# Patient Record
Sex: FEMALE | Race: WHITE | NOT HISPANIC OR LATINO | Employment: FULL TIME | URBAN - METROPOLITAN AREA
[De-identification: names, ages, dates, MRNs, and addresses within clinical notes are randomized per-mention and may not be internally consistent; named-entity substitution may affect disease eponyms.]

---

## 2017-04-17 ENCOUNTER — OFFICE VISIT (OUTPATIENT)
Dept: URGENT CARE | Facility: CLINIC | Age: 27
End: 2017-04-17
Payer: OTHER MISCELLANEOUS

## 2017-04-17 VITALS
SYSTOLIC BLOOD PRESSURE: 110 MMHG | DIASTOLIC BLOOD PRESSURE: 70 MMHG | OXYGEN SATURATION: 97 % | RESPIRATION RATE: 20 BRPM | HEART RATE: 68 BPM | BODY MASS INDEX: 21.94 KG/M2 | WEIGHT: 120 LBS | TEMPERATURE: 97.6 F

## 2017-04-17 DIAGNOSIS — J06.9 VIRAL URI WITH COUGH: ICD-10-CM

## 2017-04-17 PROCEDURE — 99203 OFFICE O/P NEW LOW 30 MIN: CPT | Performed by: NURSE PRACTITIONER

## 2017-04-17 RX ORDER — ALBUTEROL SULFATE 90 UG/1
2 AEROSOL, METERED RESPIRATORY (INHALATION) EVERY 6 HOURS PRN
Qty: 8.5 G | Refills: 0 | Status: SHIPPED | OUTPATIENT
Start: 2017-04-17 | End: 2022-04-27

## 2017-04-17 RX ORDER — PREDNISONE 10 MG/1
TABLET ORAL
Qty: 15 TAB | Refills: 0 | Status: SHIPPED | OUTPATIENT
Start: 2017-04-17 | End: 2017-04-21

## 2017-04-17 RX ORDER — CODEINE PHOSPHATE AND GUAIFENESIN 10; 100 MG/5ML; MG/5ML
5 SOLUTION ORAL EVERY 4 HOURS PRN
Qty: 300 ML | Refills: 0 | Status: SHIPPED | OUTPATIENT
Start: 2017-04-17 | End: 2019-11-14

## 2017-04-17 ASSESSMENT — ENCOUNTER SYMPTOMS
FEVER: 0
CHILLS: 0
SORE THROAT: 1
WHEEZING: 0
COUGH: 1
HEADACHES: 1
SHORTNESS OF BREATH: 1
SPUTUM PRODUCTION: 0

## 2017-04-17 NOTE — MR AVS SNAPSHOT
Jessica Hancock   2017 2:15 PM   Office Visit   MRN: [id number]    Department:  [facility]   Dept Phone:  [phone]    Description:  Female : 1990   Provider:  MAHESH Emmanuel           Reason for Visit     Cough over a week wet cough , stuffy nose and chest congestion .      Allergies as of 2017     No Known Allergies      You were diagnosed with     Viral URI with cough   [[id number]]         Vital Signs     Blood Pressure Pulse Temperature Respirations Weight Oxygen Saturation    110/70 mmHg 68 36.4 °C (97.6 °F) 20 54.432 kg (120 lb) 97%    Last Menstrual Period Smoking Status                2013 Never Smoker           Basic Information     Date Of Birth Sex Race Ethnicity Preferred Language    1990 Female White Non- English      Problem List              ICD-10-CM Priority Class Noted - Resolved    Postpartum care following vaginal delivery Z39.2   2014 - Present    Puberty bleeding N92.2   2015 - Present    Encounter for sterilization Z30.2   2015 - Present      Health Maintenance        Date Due Completion Dates    IMM HEP B VACCINE (1 of 3 - Primary Series) 1990 ---    IMM HEP A VACCINE (1 of 2 - Standard Series) 10/12/1991 ---    IMM HPV VACCINE (1 of 3 - Female 3 Dose Series) 10/12/2001 ---    IMM VARICELLA (CHICKENPOX) VACCINE (1 of 2 - 2 Dose Adolescent Series) 10/12/2003 ---    PAP SMEAR 2016    IMM DTaP/Tdap/Td Vaccine (2 - Td) 2024            Current Immunizations     Tdap Vaccine 2014  9:43 PM      Below and/or attached are the medications your provider expects you to take. Review all of your home medications and newly ordered medications with your provider and/or pharmacist. Follow medication instructions as directed by your provider and/or pharmacist. Please keep your medication list with you and share with your provider. Update the information when medications are  discontinued, doses are changed, or new medications (including over-the-counter products) are added; and carry medication information at all times in the event of emergency situations     Allergies:  No Known Allergies          Medications  Valid as of: April 17, 2017 -  3:15 PM    Generic Name Brand Name Tablet Size Instructions for use    Albuterol Sulfate (Aero Soln) albuterol 108 (90 BASE) MCG/ACT Inhale 2 Puffs by mouth every 6 hours as needed for Shortness of Breath.        Guaifenesin-Codeine (Solution) ROBITUSSIN -10 mg/5mL Take 5 mL by mouth every four hours as needed for Cough.        Hydrocodone-Acetaminophen (Tab) NORCO 5-325 MG Take 1 Tab by mouth every 6 hours as needed (for pain). Do not drive or drink alcohol or engaged in potentially hazardous activity while taking this medication        PredniSONE (Tab) DELTASONE 10 MG Take 3 tabs PO daily for five days        .                 Medicines prescribed today were sent to:     [facility]Y #[id number] - [place] - [address]    [address] [place] [address]    Phone: [phone] Fax: [phone]    Open 24 Hours?: No      Medication refill instructions:       If your prescription bottle indicates you have medication refills left, it is not necessary to call your provider’s office. Please contact your pharmacy and they will refill your medication.    If your prescription bottle indicates you do not have any refills left, you may request refills at any time through one of the following ways: The online [employer] system (except Urgent Care), by calling your provider’s office, or by asking your pharmacy to contact your provider’s office with a refill request. Medication refills are processed only during regular business hours and may not be available until the next business day. Your provider may request additional information or to have a follow-up visit with you prior to refilling your medication.   *Please Note: Medication refills are  assigned a new Rx number when refilled electronically. Your pharmacy may indicate that no refills were authorized even though a new prescription for the same medication is available at the pharmacy. Please request the medicine by name with the pharmacy before contacting your provider for a refill.           [employer] Access Code: [email or web address]  Expires: 5/17/2017 11:33 AM    [employer]  A secure, online tool to manage your health information     [employer]’s [employer]® is a secure, online tool that connects you to your personalized health information from the privacy of your home -- day or night - making it very easy for you to manage your healthcare. Once the activation process is completed, you can even access your medical information using the [employer] todd, which is available for free in the Apple Todd store or Google Play store.     [employer] provides the following levels of access (as shown below):   My Chart Features   [facility] Primary Care Doctor [facility]  Specialists [facility]  Urgent  Care Non-[facility]  Primary Care  Doctor   Email your healthcare team securely and privately 24/7 X X X    Manage appointments: schedule your next appointment; view details of past/upcoming appointments X      Request prescription refills. X      View recent personal medical records, including lab and immunizations X X X X   View health record, including health history, allergies, medications X X X X   Read reports about your outpatient visits, procedures, consult and ER notes X X X X   See your discharge summary, which is a recap of your hospital and/or ER visit that includes your diagnosis, lab results, and care plan. X X       How to register for [employer]:  1. Go to  [email or web address].  2. Click on the Sign Up Now box, which takes you to the New Member Sign Up page. You will need to provide the following information:  a. Enter your [employer] Access Code exactly as it appears at the top of this page. (You will not need to use this  code after you’ve completed the sign-up process. If you do not sign up before the expiration date, you must request a new code.)   b. Enter your date of birth.   c. Enter your home email address.   d. Click Submit, and follow the next screen’s instructions.  3. Create a [employer] ID. This will be your [employer] login ID and cannot be changed, so think of one that is secure and easy to remember.  4. Create a [employer] password. You can change your password at any time.  5. Enter your Password Reset Question and Answer. This can be used at a later time if you forget your password.   6. Enter your e-mail address. This allows you to receive e-mail notifications when new information is available in [employer].  7. Click Sign Up. You can now view your health information.    For assistance activating your [employer] account, call [phone]

## 2017-04-17 NOTE — PROGRESS NOTES
Subjective:      Jessica Hancock is a 26 y.o. female who presents with Cough            Cough  This is a new problem. Episode onset: 8 days ago. The problem has been unchanged. The cough is non-productive. Associated symptoms include headaches, nasal congestion, postnasal drip, a sore throat and shortness of breath. Pertinent negatives include no chills, fever or wheezing. Nothing aggravates the symptoms. She has tried OTC cough suppressant and rest for the symptoms. The treatment provided no relief. There is no history of asthma or bronchitis.       Review of Systems   Constitutional: Positive for malaise/fatigue. Negative for fever and chills.   HENT: Positive for congestion, postnasal drip and sore throat.    Respiratory: Positive for cough and shortness of breath. Negative for sputum production and wheezing.    Neurological: Positive for headaches.   All other systems reviewed and are negative.    PMH:  has a past medical history of Hydronephrosis (2/12/2014). She also has no past medical history of Addisons disease, Adrenal disorder, Allergy, Anemia, Anxiety, Blood transfusion, Clotting disorder, COPD, Cushings syndrome, Diabetic neuropathy, GERD (gastroesophageal reflux disease), Hyperlipidemia, Parathyroid disorder, IBD (inflammatory bowel disease), Meningitis, Migraine, Muscle disorder, OSTEOPOROSIS, Pituitary disease, Sickle cell disease, Substance abuse, or Ulcer.  MEDS:   Current outpatient prescriptions:   •  predniSONE (DELTASONE) 10 MG Tab, Take 3 tabs PO daily for five days, Disp: 15 Tab, Rfl: 0  •  albuterol 108 (90 BASE) MCG/ACT Aero Soln inhalation aerosol, Inhale 2 Puffs by mouth every 6 hours as needed for Shortness of Breath., Disp: 8.5 g, Rfl: 0  •  guaifenesin-codeine (ROBITUSSIN AC) Solution oral solution, Take 5 mL by mouth every four hours as needed for Cough., Disp: 300 mL, Rfl: 0  •  hydrocodone-acetaminophen (NORCO) 5-325 MG Tab per tablet, Take 1 Tab by mouth every 6 hours as  needed (for pain). Do not drive or drink alcohol or engaged in potentially hazardous activity while taking this medication, Disp: 20 Tab, Rfl: 0  ALLERGIES: No Known Allergies  SURGHX:   Past Surgical History   Procedure Laterality Date   • Hysteroscopy novasure-2 N/A 5/20/2015     Procedure: HYSTEROSCOPY NOVASURE ENDOMETRIAL ABLATION;  Surgeon: Roddy Simmons M.D.;  Location: SURGERY SAME DAY [facility] ORS;  Service:    • Dilation and curettage N/A 5/20/2015     Procedure: DILATION AND CURETTAGE;  Surgeon: Roddy Simmons M.D.;  Location: SURGERY SAME DAY [facility] ORS;  Service:    • Tubal coagulation laparoscopic bilateral Bilateral 5/20/2015     Procedure: TUBAL COAGULATION LAPAROSCOPIC BILATERAL;  Surgeon: Roddy Simmons M.D.;  Location: SURGERY SAME DAY [facility] ORS;  Service:      SOCHX:  reports that she has never smoked. She has never used smokeless tobacco. She reports that she does not drink alcohol or use illicit drugs.  FH: In accordance with TRAE Act of 2008, family history is not collected for Occupational Health visits.         Objective:     /70 mmHg  Pulse 68  Temp(Src) 36.4 °C (97.6 °F)  Resp 20  Wt 54.432 kg (120 lb)  SpO2 97%  LMP 07/25/2013     Physical Exam   Constitutional: She is oriented to person, place, and time. Vital signs are normal. She appears well-developed and well-nourished.   HENT:   Head: Normocephalic.   Right Ear: Tympanic membrane and external ear normal.   Left Ear: Tympanic membrane and external ear normal.   Nose: Sinus tenderness present.   Mouth/Throat: Oropharynx is clear and moist.   Eyes: EOM are normal. Pupils are equal, round, and reactive to light.   Neck: Normal range of motion.   Cardiovascular: Normal rate and regular rhythm.    Pulmonary/Chest: Effort normal. She has rhonchi in the right upper field and the left upper field.   Musculoskeletal: Normal range of motion.   Lymphadenopathy:        Head (right side): Submandibular adenopathy present.         Head (left side): Submandibular adenopathy present.     She has no cervical adenopathy.   Neurological: She is alert and oriented to person, place, and time.   Skin: Skin is warm and dry.   Psychiatric: She has a normal mood and affect. Her speech is normal and behavior is normal. Thought content normal.   Vitals reviewed.              Assessment/Plan:     1. Viral URI with cough  - predniSONE (DELTASONE) 10 MG Tab; Take 3 tabs PO daily for five days  Dispense: 15 Tab; Refill: 0  - albuterol 108 (90 BASE) MCG/ACT Aero Soln inhalation aerosol; Inhale 2 Puffs by mouth every 6 hours as needed for Shortness of Breath.  Dispense: 8.5 g; Refill: 0  - guaifenesin-codeine (ROBITUSSIN AC) Solution oral solution; Take 5 mL by mouth every four hours as needed for Cough.  Dispense: 300 mL; Refill: 0    Continue OTC daytime cough syrup  Daily OTC antihistamine  Supportive care, differential diagnoses, and indications for immediate follow-up discussed with patient.    Pathogenesis of diagnosis discussed including typical length and natural progression.      Instructed to return to  or nearest emergency department if symptoms fail to improve, for any change in condition, further concerns, or new concerning symptoms.  Patient states understanding of the plan of care and discharge instructions.

## 2017-04-17 NOTE — Clinical Note
April 17, 2017         Patient: Jessica Hancock   YOB: 1990   Date of Visit: 4/17/2017           To Whom it May Concern:    Jessica Hancock was seen in my clinic on 4/17/2017. Please excuse her from work today.      Sincerely,           BROOK Emmanuel.  Electronically Signed

## 2017-05-11 ENCOUNTER — HOSPITAL ENCOUNTER (OUTPATIENT)
Facility: MEDICAL CENTER | Age: 27
End: 2017-05-11
Attending: SPECIALIST
Payer: MEDICAID

## 2017-05-11 LAB — PATHOLOGY CONSULT NOTE: NORMAL

## 2017-05-11 PROCEDURE — 88305 TISSUE EXAM BY PATHOLOGIST: CPT

## 2017-08-25 ENCOUNTER — HOSPITAL ENCOUNTER (OUTPATIENT)
Facility: MEDICAL CENTER | Age: 27
End: 2017-08-25
Attending: SPECIALIST
Payer: COMMERCIAL

## 2017-08-25 PROCEDURE — 88305 TISSUE EXAM BY PATHOLOGIST: CPT

## 2017-08-28 LAB — PATHOLOGY CONSULT NOTE: NORMAL

## 2018-03-13 ENCOUNTER — GYNECOLOGY VISIT (OUTPATIENT)
Dept: OBGYN | Facility: CLINIC | Age: 28
End: 2018-03-13
Payer: MEDICAID

## 2018-03-13 ENCOUNTER — HOSPITAL ENCOUNTER (OUTPATIENT)
Facility: MEDICAL CENTER | Age: 28
End: 2018-03-13
Attending: OBSTETRICS & GYNECOLOGY
Payer: MEDICAID

## 2018-03-13 VITALS
SYSTOLIC BLOOD PRESSURE: 98 MMHG | BODY MASS INDEX: 21.16 KG/M2 | WEIGHT: 115 LBS | HEIGHT: 62 IN | DIASTOLIC BLOOD PRESSURE: 62 MMHG

## 2018-03-13 DIAGNOSIS — N89.8 VAGINAL ODOR: ICD-10-CM

## 2018-03-13 DIAGNOSIS — R10.2 PELVIC PAIN IN FEMALE: ICD-10-CM

## 2018-03-13 LAB
CANDIDA DNA VAG QL PROBE+SIG AMP: NEGATIVE
G VAGINALIS DNA VAG QL PROBE+SIG AMP: POSITIVE
T VAGINALIS DNA VAG QL PROBE+SIG AMP: NEGATIVE

## 2018-03-13 PROCEDURE — 87510 GARDNER VAG DNA DIR PROBE: CPT

## 2018-03-13 PROCEDURE — 87480 CANDIDA DNA DIR PROBE: CPT

## 2018-03-13 PROCEDURE — 87491 CHLMYD TRACH DNA AMP PROBE: CPT

## 2018-03-13 PROCEDURE — 99205 OFFICE O/P NEW HI 60 MIN: CPT | Performed by: OBSTETRICS & GYNECOLOGY

## 2018-03-13 PROCEDURE — 87591 N.GONORRHOEAE DNA AMP PROB: CPT

## 2018-03-13 PROCEDURE — 87660 TRICHOMONAS VAGIN DIR PROBE: CPT

## 2018-03-13 ASSESSMENT — ENCOUNTER SYMPTOMS
HEARTBURN: 0
DEPRESSION: 0
MYALGIAS: 0
RESPIRATORY NEGATIVE: 1
CONSTITUTIONAL NEGATIVE: 1
NAUSEA: 0
CARDIOVASCULAR NEGATIVE: 1
DIZZINESS: 0
EYES NEGATIVE: 1
BRUISES/BLEEDS EASILY: 0
ABDOMINAL PAIN: 1
VOMITING: 0

## 2018-03-13 NOTE — NON-PROVIDER
Pt here for New GYN would like 2nd opinion on cyst and tumor  # [phone]  Pt states having a discharge with a foul odor.

## 2018-03-14 DIAGNOSIS — B96.89 BV (BACTERIAL VAGINOSIS): ICD-10-CM

## 2018-03-14 DIAGNOSIS — N76.0 BV (BACTERIAL VAGINOSIS): ICD-10-CM

## 2018-03-14 LAB
C TRACH DNA SPEC QL NAA+PROBE: NEGATIVE
N GONORRHOEA DNA SPEC QL NAA+PROBE: NEGATIVE
SPECIMEN SOURCE: NORMAL

## 2018-03-14 NOTE — TELEPHONE ENCOUNTER
Notes Recorded by Lesa Herrera M.D. on 3/14/2018 at 12:26 PM PDT  Bacterial vaginosis please call out Flagyl 500 by mouth twice a day for 7 days    Unable to contact pt msg left to Call back.    3/16/18 @ 0651  Pt notified, instructed to finish whole treatment and not intercourse while on medication.    Verbalized understandig.

## 2018-03-15 ENCOUNTER — TELEPHONE (OUTPATIENT)
Dept: OBGYN | Facility: CLINIC | Age: 28
End: 2018-03-15

## 2018-03-15 NOTE — TELEPHONE ENCOUNTER
Pt called back. Let pt know that her labs that were done indicated she had bacterial vaginosis. A Rx Flagyl 500 mg was sent to her [facility] pharmacy on [address]. To take as directed twice daily for 7 days. Pt verbalized understanding. And had no further questions.

## 2018-03-15 NOTE — TELEPHONE ENCOUNTER
Pt returned phone call from earlier. Called pt back and went straight to VM left another message to call back.

## 2018-03-16 RX ORDER — METRONIDAZOLE 500 MG/1
500 TABLET ORAL 2 TIMES DAILY
Qty: 14 TAB | Refills: 0 | Status: SHIPPED | OUTPATIENT
Start: 2018-03-16 | End: 2018-03-23

## 2018-03-23 ENCOUNTER — HOSPITAL ENCOUNTER (OUTPATIENT)
Dept: RADIOLOGY | Facility: MEDICAL CENTER | Age: 28
End: 2018-03-23
Attending: OBSTETRICS & GYNECOLOGY
Payer: MEDICAID

## 2018-03-23 DIAGNOSIS — R10.2 PELVIC PAIN IN FEMALE: ICD-10-CM

## 2018-03-23 PROCEDURE — 76830 TRANSVAGINAL US NON-OB: CPT

## 2018-08-20 ENCOUNTER — APPOINTMENT (OUTPATIENT)
Dept: RADIOLOGY | Facility: MEDICAL CENTER | Age: 28
End: 2018-08-20
Attending: EMERGENCY MEDICINE
Payer: MEDICAID

## 2018-08-20 ENCOUNTER — HOSPITAL ENCOUNTER (EMERGENCY)
Facility: MEDICAL CENTER | Age: 28
End: 2018-08-20
Attending: EMERGENCY MEDICINE
Payer: MEDICAID

## 2018-08-20 VITALS
BODY MASS INDEX: 21.79 KG/M2 | WEIGHT: 118.39 LBS | HEIGHT: 62 IN | OXYGEN SATURATION: 97 % | DIASTOLIC BLOOD PRESSURE: 76 MMHG | SYSTOLIC BLOOD PRESSURE: 101 MMHG | RESPIRATION RATE: 20 BRPM | HEART RATE: 67 BPM | TEMPERATURE: 98.7 F

## 2018-08-20 DIAGNOSIS — N76.1 SUBACUTE VAGINITIS: ICD-10-CM

## 2018-08-20 DIAGNOSIS — R10.2 PELVIC PAIN: ICD-10-CM

## 2018-08-20 LAB
ALBUMIN SERPL BCP-MCNC: 4.4 G/DL (ref 3.2–4.9)
ALBUMIN/GLOB SERPL: 1.4 G/DL
ALP SERPL-CCNC: 61 U/L (ref 30–99)
ALT SERPL-CCNC: 10 U/L (ref 2–50)
ANION GAP SERPL CALC-SCNC: 9 MMOL/L (ref 0–11.9)
APPEARANCE UR: CLEAR
AST SERPL-CCNC: 15 U/L (ref 12–45)
BACTERIA GENITAL QL WET PREP: NORMAL
BASOPHILS # BLD AUTO: 0.6 % (ref 0–1.8)
BASOPHILS # BLD: 0.04 K/UL (ref 0–0.12)
BILIRUB SERPL-MCNC: 0.3 MG/DL (ref 0.1–1.5)
BILIRUB UR QL STRIP.AUTO: NEGATIVE
BUN SERPL-MCNC: 15 MG/DL (ref 8–22)
CALCIUM SERPL-MCNC: 9.1 MG/DL (ref 8.5–10.5)
CHLORIDE SERPL-SCNC: 105 MMOL/L (ref 96–112)
CO2 SERPL-SCNC: 22 MMOL/L (ref 20–33)
COLOR UR: YELLOW
CREAT SERPL-MCNC: 0.52 MG/DL (ref 0.5–1.4)
EOSINOPHIL # BLD AUTO: 0.3 K/UL (ref 0–0.51)
EOSINOPHIL NFR BLD: 4.3 % (ref 0–6.9)
ERYTHROCYTE [DISTWIDTH] IN BLOOD BY AUTOMATED COUNT: 39 FL (ref 35.9–50)
GLOBULIN SER CALC-MCNC: 3.2 G/DL (ref 1.9–3.5)
GLUCOSE SERPL-MCNC: 85 MG/DL (ref 65–99)
GLUCOSE UR STRIP.AUTO-MCNC: NEGATIVE MG/DL
HCG SERPL QL: NEGATIVE
HCT VFR BLD AUTO: 40.7 % (ref 37–47)
HGB BLD-MCNC: 13.5 G/DL (ref 12–16)
IMM GRANULOCYTES # BLD AUTO: 0.01 K/UL (ref 0–0.11)
IMM GRANULOCYTES NFR BLD AUTO: 0.1 % (ref 0–0.9)
KETONES UR STRIP.AUTO-MCNC: NEGATIVE MG/DL
LEUKOCYTE ESTERASE UR QL STRIP.AUTO: NEGATIVE
LYMPHOCYTES # BLD AUTO: 2.93 K/UL (ref 1–4.8)
LYMPHOCYTES NFR BLD: 41.6 % (ref 22–41)
MCH RBC QN AUTO: 29.8 PG (ref 27–33)
MCHC RBC AUTO-ENTMCNC: 33.2 G/DL (ref 33.6–35)
MCV RBC AUTO: 89.8 FL (ref 81.4–97.8)
MICRO URNS: NORMAL
MONOCYTES # BLD AUTO: 0.66 K/UL (ref 0–0.85)
MONOCYTES NFR BLD AUTO: 9.4 % (ref 0–13.4)
NEUTROPHILS # BLD AUTO: 3.11 K/UL (ref 2–7.15)
NEUTROPHILS NFR BLD: 44 % (ref 44–72)
NITRITE UR QL STRIP.AUTO: NEGATIVE
NRBC # BLD AUTO: 0 K/UL
NRBC BLD-RTO: 0 /100 WBC
PH UR STRIP.AUTO: 6 [PH]
PLATELET # BLD AUTO: 270 K/UL (ref 164–446)
PMV BLD AUTO: 9.6 FL (ref 9–12.9)
POTASSIUM SERPL-SCNC: 3.8 MMOL/L (ref 3.6–5.5)
PROT SERPL-MCNC: 7.6 G/DL (ref 6–8.2)
PROT UR QL STRIP: NEGATIVE MG/DL
RBC # BLD AUTO: 4.53 M/UL (ref 4.2–5.4)
RBC UR QL AUTO: NEGATIVE
SIGNIFICANT IND 70042: NORMAL
SITE SITE: NORMAL
SODIUM SERPL-SCNC: 136 MMOL/L (ref 135–145)
SOURCE SOURCE: NORMAL
SP GR UR STRIP.AUTO: 1.03
UROBILINOGEN UR STRIP.AUTO-MCNC: 0.2 MG/DL
WBC # BLD AUTO: 7.1 K/UL (ref 4.8–10.8)

## 2018-08-20 PROCEDURE — 76830 TRANSVAGINAL US NON-OB: CPT

## 2018-08-20 PROCEDURE — 80053 COMPREHEN METABOLIC PANEL: CPT

## 2018-08-20 PROCEDURE — 87591 N.GONORRHOEAE DNA AMP PROB: CPT

## 2018-08-20 PROCEDURE — 81003 URINALYSIS AUTO W/O SCOPE: CPT

## 2018-08-20 PROCEDURE — 84703 CHORIONIC GONADOTROPIN ASSAY: CPT

## 2018-08-20 PROCEDURE — 87491 CHLMYD TRACH DNA AMP PROBE: CPT

## 2018-08-20 PROCEDURE — 85025 COMPLETE CBC W/AUTO DIFF WBC: CPT

## 2018-08-20 PROCEDURE — 99284 EMERGENCY DEPT VISIT MOD MDM: CPT

## 2018-08-20 RX ORDER — METRONIDAZOLE 500 MG/1
500 TABLET ORAL 3 TIMES DAILY
Qty: 21 TAB | Refills: 0 | Status: SHIPPED | OUTPATIENT
Start: 2018-08-20 | End: 2018-08-27

## 2018-08-20 NOTE — ED PROVIDER NOTES
"ED Provider Note    Scribed for Jagdish Cobb M.D. by Eleazar Plunkett. 2018  4:04 PM    Primary Care Provider: Pcp Pt States None  Means of arrival: Walk In   History limited by: None     CHIEF COMPLAINT  Chief Complaint   Patient presents with   • Pelvic Pain     hx of mass in uterus     HPI  Jessica Hancock is a 27 y.o. female who presents to the ED complaining of pelvic pain.   The patient complains of intermittent right sided pelvic pain for the last few weeks. She describes her pain as \"dull\", and she rates her worst pain as being 9/10 in severity. The patient describes her pain has been constant for the last few days with occasional \"sharp\" pelvic pain radiating down her right leg. The patient describes her pain as feeling similar to her history of ovarian cysts. She has medicated her pain with Ibuprofen and Tylenol PO with some relief.   The patient had a bilateral laparoscopic tubal coagulation performed in  by Dr. Simmons. The patient has an obstetric history of . Last menstrual period was years ago. She denies any abnormal vaginal discharge.     The patient denies any fever, weakness, blurred vision, sore throat, chest pain, palpitations, leg swelling, nausea, vomiting, diarrhea, back pain, or rash.       REVIEW OF SYSTEMS  CONSTITUTIONAL:  Denies fever, chills, weight gain/loss, or weakness.  EYES:  Denies blurred vision.   ENT:  Denies sore throat  CARDIOVASCULAR:  Denies chest pain, palpitations, or swelling.  RESPIRATORY:  Denies cough, shortness of breath, difficulty breathing.  GI:  Positive for right sided pelvic pain. Denies nausea, vomiting, or diarrhea.  MUSCULOSKELETAL:  Denies weakness, joint swelling, or back pain.  SKIN:  No rash.   NEUROLOGIC:  Denies headache, focal weakness, or numbness.    PAST MEDICAL HISTORY  Past Medical History:   Diagnosis Date   • Hydronephrosis 2014     FAMILY HISTORY  Family History   Problem Relation Age of Onset   • No Known Problems " "Mother    • No Known Problems Father    • Blood Clots Sister    • Arthritis Maternal Grandmother    • Hypertension Maternal Grandmother    • No Known Problems Maternal Grandfather    • Depression Paternal Grandmother    • No Known Problems Sister      SOCIAL HISTORY   reports that she has been smoking Cigarettes.  She has smoked for the past 12.00 years. She has never used smokeless tobacco. She reports that she uses drugs, including Marijuana. She reports that she does not drink alcohol.    SURGICAL HISTORY  Past Surgical History:   Procedure Laterality Date   • HYSTEROSCOPY NOVASURE-2 N/A 5/20/2015    Procedure: HYSTEROSCOPY NOVASURE ENDOMETRIAL ABLATION;  Surgeon: Roddy Simmons M.D.;  Location: SURGERY SAME DAY [facility] ORS;  Service:    • DILATION AND CURETTAGE N/A 5/20/2015    Procedure: DILATION AND CURETTAGE;  Surgeon: Roddy Simmons M.D.;  Location: SURGERY SAME DAY [facility] ORS;  Service:    • TUBAL COAGULATION LAPAROSCOPIC BILATERAL Bilateral 5/20/2015    Procedure: TUBAL COAGULATION LAPAROSCOPIC BILATERAL;  Surgeon: Roddy Simmons M.D.;  Location: SURGERY SAME DAY [facility] ORS;  Service:      CURRENT MEDICATIONS  Home Medications    **Home medications have not yet been reviewed for this encounter**       ALLERGIES  No Known Allergies    PHYSICAL EXAM  VITAL SIGNS: /58   Pulse 74   Temp 37.1 °C (98.7 °F)   Resp 16   Ht 1.575 m (5' 2\")   Wt 53.7 kg (118 lb 6.2 oz)   SpO2 97%   BMI 21.65 kg/m²      Constitutional: Patient is awake and alert. No acute respiratory distress. Well developed, Well nourished, Non-toxic appearance.   HENT: Normocephalic, Atraumatic, Bilateral external ears normal, Oropharynx pink moist with no exudates.   Eyes: Sclera and conjunctiva clear, No discharge.   Neck:  Supple no nuchal rigidity, no thyromegaly or mass.  Lymphatic: No supraclavicular lymph nodes.   Cardiovascular: Heart is regular rate and rhythm no murmur,  Thorax & Lungs: Chest is symmetrical, with " good breath sounds. No wheezing or crackles. No respiratory distress, No chest tenderness.   Abdomen: Soft, Tenderness in suprapubic region and right lower abdomen. NO upper abdominal tenderness. No hepatosplenomegaly there is no guarding or rebound, No masses, No pulsatile masses.   Pelvic: Chaperoned by female RN in the room. Normal female external genitalia. Vaginal vault: small amount of white discharge. Bimanual exam: No left adnexal or uterine tenderness. There is tenderness in right adnexa low in the pelvis. Obtained wet prep and GC probe.   Skin: Warm, Dry, no petechia, purpura, or rash.   Back: Non tender with palpation, No bilateral CVA tenderness.   Extremities: No edema.  Musculoskeletal: Good range of motion to upper lower extremity   neurologic: Alert & oriented  Strength is symmetrical in the upper extremities  Psychiatric: Normal affect        LABS  Results for orders placed or performed during the hospital encounter of 08/20/18   CBC WITH DIFFERENTIAL   Result Value Ref Range    WBC 7.1 4.8 - 10.8 K/uL    RBC 4.53 4.20 - 5.40 M/uL    Hemoglobin 13.5 12.0 - 16.0 g/dL    Hematocrit 40.7 37.0 - 47.0 %    MCV 89.8 81.4 - 97.8 fL    MCH 29.8 27.0 - 33.0 pg    MCHC 33.2 (L) 33.6 - 35.0 g/dL    RDW 39.0 35.9 - 50.0 fL    Platelet Count 270 164 - 446 K/uL    MPV 9.6 9.0 - 12.9 fL    Neutrophils-Polys 44.00 44.00 - 72.00 %    Lymphocytes 41.60 (H) 22.00 - 41.00 %    Monocytes 9.40 0.00 - 13.40 %    Eosinophils 4.30 0.00 - 6.90 %    Basophils 0.60 0.00 - 1.80 %    Immature Granulocytes 0.10 0.00 - 0.90 %    Nucleated RBC 0.00 /100 WBC    Neutrophils (Absolute) 3.11 2.00 - 7.15 K/uL    Lymphs (Absolute) 2.93 1.00 - 4.80 K/uL    Monos (Absolute) 0.66 0.00 - 0.85 K/uL    Eos (Absolute) 0.30 0.00 - 0.51 K/uL    Baso (Absolute) 0.04 0.00 - 0.12 K/uL    Immature Granulocytes (abs) 0.01 0.00 - 0.11 K/uL    NRBC (Absolute) 0.00 K/uL   COMP METABOLIC PANEL   Result Value Ref Range    Sodium 136 135 - 145 mmol/L     Potassium 3.8 3.6 - 5.5 mmol/L    Chloride 105 96 - 112 mmol/L    Co2 22 20 - 33 mmol/L    Anion Gap 9.0 0.0 - 11.9    Glucose 85 65 - 99 mg/dL    Bun 15 8 - 22 mg/dL    Creatinine 0.52 0.50 - 1.40 mg/dL    Calcium 9.1 8.5 - 10.5 mg/dL    AST(SGOT) 15 12 - 45 U/L    ALT(SGPT) 10 2 - 50 U/L    Alkaline Phosphatase 61 30 - 99 U/L    Total Bilirubin 0.3 0.1 - 1.5 mg/dL    Albumin 4.4 3.2 - 4.9 g/dL    Total Protein 7.6 6.0 - 8.2 g/dL    Globulin 3.2 1.9 - 3.5 g/dL    A-G Ratio 1.4 g/dL   HCG QUAL SERUM   Result Value Ref Range    Beta-Hcg Qualitative Serum Negative Negative   URINALYSIS CULTURE, IF INDICATED   Result Value Ref Range    Color Yellow     Character Clear     Specific Gravity 1.029 <1.035    Ph 6.0 5.0 - 8.0    Glucose Negative Negative mg/dL    Ketones Negative Negative mg/dL    Protein Negative Negative mg/dL    Bilirubin Negative Negative    Urobilinogen, Urine 0.2 Negative    Nitrite Negative Negative    Leukocyte Esterase Negative Negative    Occult Blood Negative Negative    Micro Urine Req see below    WET PREP   Result Value Ref Range    Significant Indicator NEG     Source GEN     Site VAGINAL     Wet Prep For Parasites       Moderate WBC's seen.  Few clue cells seen.  No yeast.  No motile Trichomonas seen.     CHLAMYDIA & GC BY PCR   Result Value Ref Range    Source Other    ESTIMATED GFR   Result Value Ref Range    GFR If African American >60 >60 mL/min/1.73 m 2    GFR If Non African American >60 >60 mL/min/1.73 m 2      All labs reviewed by me.    RADIOLOGY/PROCEDURES  US-GYN-PELVIS TRANSVAGINAL   Final Result      Small amount of nonspecific fluid in the endometrial canal extending into the cornua.        The radiologist's interpretations of all radiological studies have been reviewed by me.     COURSE & MEDICAL DECISION MAKING  Pertinent Labs & Imaging studies reviewed. (See chart for details)    Differential diagnoses include but are not limited to ovarian cyst, ovarian torsion, ectopic  pregnancy, intrauterine pregnancy, kidney stone, urinary tract infection, pelvic inflammatory disease, appendicitis    4:04 PM - Patient seen and examined at bedside. Ordered CBC, CMP, HCG, urinalysis.      4:34 PM Performed pelvic exam chaperoned by female RN. Obtained GC probe and wet prep. Ultrasound was ordered.     7:12 PM Recheck: Patient re-evaluated at beside. The patient was updated of her lab and ultrasound results.   The patient was prescribed Flagyl for treatment of subacute vaginitis.   We discussed discharge instructions. The patient was given strict return precautions. Advised close follow up with [facility] in 3 days.         Decision Making  Patient states that she has had intermittent chronic right lower quadrant abdominal pain.  She initially seen a gynecologist who did not biopsy on her uterus.  But is in the last couple of weeks the pain seems to be getting a little bit worse and she is concerned.  Here in the emergency room she did have some right adnexal pain certainly seem to be more on the ovary area than anywhere else.  Her workup revealed that she had clue cells and probable vaginitis.  I do not clinically think she had appendicitis.  Will put her on Flagyl for now.  There is no signs of torsion.  She knows that she needs close follow-up with gynecology and she will follow-up the primary care physician in the next 1-2 days as well.          DISPOSITION:  Patient will be discharged home in stable condition.    FOLLOW UP:  [address] [address]  [phone]  In 3 days      OUTPATIENT MEDICATIONS:  New Prescriptions    METRONIDAZOLE (FLAGYL) 500 MG TAB    Take 1 Tab by mouth 3 times a day for 7 days.      FINAL IMPRESSION  1. Pelvic pain    2. Subacute vaginitis        PLAN  1.  Follow-up primary care physician as an outpatient within 3 days  2.  Vaginitis information sheet  3.  Flagyl  4.  Pelvic pain information sheet  5. Return to the emergency  department for increased pains, fevers, vomiting or change in condition.     IEleazar (Scribe), am scribing for, and in the presence of, Jagdish Cobb M.D..    Electronically signed by: Eleazar Plunkett (Scribe), 8/20/2018    IJagdish M.D. personally performed the services described in this documentation, as scribed by Eleazar Plunkett in my presence, and it is both accurate and complete.    The note accurately reflects work and decisions made by me.  Jagdish Cobb  8/20/2018  11:35 PM

## 2018-08-20 NOTE — ED TRIAGE NOTES
Pt ambulates to triage  Chief Complaint   Patient presents with   • Pelvic Pain     hx of mass in uterus   pt reports pain x 1 year  No evidence of mass seen in medical record, seen by MD for same in past  Pt asked to wait in lobby, pt updated on triage process and pt asked to inform RN of any changes.

## 2019-07-16 ENCOUNTER — GYNECOLOGY VISIT (OUTPATIENT)
Dept: OBGYN | Facility: CLINIC | Age: 29
End: 2019-07-16
Payer: COMMERCIAL

## 2019-07-16 ENCOUNTER — HOSPITAL ENCOUNTER (OUTPATIENT)
Facility: MEDICAL CENTER | Age: 29
End: 2019-07-16
Attending: OBSTETRICS & GYNECOLOGY
Payer: COMMERCIAL

## 2019-07-16 VITALS — SYSTOLIC BLOOD PRESSURE: 100 MMHG | BODY MASS INDEX: 21.77 KG/M2 | WEIGHT: 119 LBS | DIASTOLIC BLOOD PRESSURE: 58 MMHG

## 2019-07-16 DIAGNOSIS — Z11.3 SCREEN FOR STD (SEXUALLY TRANSMITTED DISEASE): ICD-10-CM

## 2019-07-16 DIAGNOSIS — Z01.411 ENCNTR FOR GYN EXAM (GENERAL) (ROUTINE) W ABNORMAL FINDINGS: ICD-10-CM

## 2019-07-16 DIAGNOSIS — R10.2 PELVIC PAIN: ICD-10-CM

## 2019-07-16 DIAGNOSIS — Z98.51 HX OF TUBAL LIGATION: ICD-10-CM

## 2019-07-16 DIAGNOSIS — Z12.4 CERVICAL CANCER SCREENING: ICD-10-CM

## 2019-07-16 DIAGNOSIS — Z98.890 HISTORY OF ENDOMETRIAL ABLATION: ICD-10-CM

## 2019-07-16 PROCEDURE — 87491 CHLMYD TRACH DNA AMP PROBE: CPT

## 2019-07-16 PROCEDURE — 87591 N.GONORRHOEAE DNA AMP PROB: CPT

## 2019-07-16 PROCEDURE — 88142 CYTOPATH C/V THIN LAYER: CPT

## 2019-07-16 PROCEDURE — 99395 PREV VISIT EST AGE 18-39: CPT | Performed by: OBSTETRICS & GYNECOLOGY

## 2019-07-16 NOTE — PROGRESS NOTES
Subjective:      Jessica Hancock is a 28 y.o. female who presents for Annual Exam            HPI patient is a 28-year-old  4 para 3-0-1-3 who presents today for annual gynecologic exam.  Patient underwent tubal ligation along with endometrial ablation in 2016 and states she started experiencing right lower pelvic pain and discomfort a few months after procedure.  Symptoms are present most days of the month and patient states she can go 1 to 2 days without feeling any of the pain symptoms but symptoms are there most days of the month.  She usually uses a heating pack which helped relieve some of the symptoms.  Patient states she was amenorrheic since ablation until about a month ago when she had one episode of bleeding.  She denies any vaginal discharge  She states she noticed a few bumps in the periurethral area that are small and white in appearance and she requested screening for gonorrhea and chlamydia.  She reports no history of PID or STDs.  Reports no abnormal Pap smears.  Patient reports normal bowel and bladder functions    ROS all organ systems were reviewed and were negative except for complaints in HPI       Objective:     /58   Wt 54 kg (119 lb)   LMP 2019 (Exact Date)   BMI 21.77 kg/m²      Physical Exam   Constitutional: She is oriented to person, place, and time. She appears well-developed and well-nourished. No distress.   HENT:   Head: Normocephalic and atraumatic.   Eyes: Pupils are equal, round, and reactive to light. Right eye exhibits no discharge. Left eye exhibits no discharge.   Neck: Normal range of motion. Neck supple. No tracheal deviation present. No thyromegaly present.   Cardiovascular: Normal rate, regular rhythm, normal heart sounds and intact distal pulses.    Pulmonary/Chest: Effort normal and breath sounds normal. No respiratory distress. She has no wheezes. She has no rales. Right breast exhibits no inverted nipple, no mass, no nipple discharge, no  skin change and no tenderness. Left breast exhibits no inverted nipple, no mass, no nipple discharge, no skin change and no tenderness.   Abdominal: Soft. Bowel sounds are normal. She exhibits no distension. There is tenderness (Mild soreness and palpation of right lower quadrant). There is no guarding. Hernia confirmed negative in the right inguinal area and confirmed negative in the left inguinal area.   Genitourinary: There is no rash, tenderness, lesion or injury on the right labia. There is no rash, tenderness, lesion or injury on the left labia. Uterus is not enlarged and not tender. Cervix exhibits no motion tenderness, no discharge and no friability. Right adnexum displays tenderness (Mild tenderness to palpation). Right adnexum displays no mass and no fullness. Left adnexum displays no mass, no tenderness and no fullness. No erythema, tenderness or bleeding in the vagina. No foreign body in the vagina. No signs of injury around the vagina. No vaginal discharge found.   Musculoskeletal: Normal range of motion. She exhibits no edema or deformity.   Lymphadenopathy:        Right: No inguinal adenopathy present.        Left: No inguinal adenopathy present.   Neurological: She is alert and oriented to person, place, and time. She displays normal reflexes. No cranial nerve deficit. Coordination normal.   Skin: Skin is warm and dry. No rash noted. She is not diaphoretic. No erythema.   Psychiatric: She has a normal mood and affect. Her behavior is normal. Judgment and thought content normal.   Nursing note and vitals reviewed.       Discussion:    I reviewed 2 previous ultrasounds with patient showing normal adnexa.  Her last ultrasound there was some fluid extending from the uterus to the fallopian tube.  I discussed possibility of post ablation/post tubal ligation syndrome.  I discussed pelvic pain and possible etiology.  Patient desires to observe symptoms at this point and will call office if her symptoms  worsens.  We discussed need for possible follow-up ultrasound if her symptoms worsen and patient agrees.       Assessment/Plan:     1. Encntr for gyn exam (general) (routine) w abnormal findings  28-year-old multiparous female here for annual gynecologic exam    2. Pelvic pain  Patient counseled on possible etiology for pain.  Differential diagnosis discussed.  Foot measures reviewed.  No treatment desired at this point    3. Hx of tubal ligation  Patient has had history of laparoscopic tubal ligation    4. History of endometrial ablation  Patient has had history of endometrial ablation    Safe sex practices discussed      Monthly self breast exams encouraged    Precautions and plan of care reviewed    5. Cervical cancer screening  Cervical cancer screening-Pap smear obtained    6. Screen for STD (sexually transmitted disease)  Patient desires testing for gonorrhea chlamydia-testing obtained

## 2019-07-16 NOTE — NON-PROVIDER
Pt here for annual exam with pap  Pt states having right ovarian pain  Good#[phone]  Pharmacy verified

## 2019-07-18 LAB
C TRACH DNA GENITAL QL NAA+PROBE: NEGATIVE
N GONORRHOEA DNA GENITAL QL NAA+PROBE: NEGATIVE
SPECIMEN SOURCE: NORMAL

## 2019-07-27 LAB — TEST NAME 95000: NORMAL

## 2019-07-29 ENCOUNTER — TELEPHONE (OUTPATIENT)
Dept: OBGYN | Facility: CLINIC | Age: 29
End: 2019-07-29

## 2019-07-29 NOTE — TELEPHONE ENCOUNTER
"Pt called stating she received \"miscellaneous\" results but unsure of what that is, adv pt pap results and  Notified pt they are negative and GC/CT as well. Pt understood and had no further questions   "

## 2019-11-11 ENCOUNTER — APPOINTMENT (OUTPATIENT)
Dept: URGENT CARE | Facility: PHYSICIAN GROUP | Age: 29
End: 2019-11-11
Payer: COMMERCIAL

## 2019-11-13 ENCOUNTER — TELEPHONE (OUTPATIENT)
Dept: SCHEDULING | Facility: IMAGING CENTER | Age: 29
End: 2019-11-13

## 2019-11-14 ENCOUNTER — HOSPITAL ENCOUNTER (OUTPATIENT)
Dept: LAB | Facility: MEDICAL CENTER | Age: 29
End: 2019-11-14
Attending: NURSE PRACTITIONER
Payer: COMMERCIAL

## 2019-11-14 ENCOUNTER — OFFICE VISIT (OUTPATIENT)
Dept: MEDICAL GROUP | Facility: PHYSICIAN GROUP | Age: 29
End: 2019-11-14
Payer: COMMERCIAL

## 2019-11-14 VITALS
OXYGEN SATURATION: 98 % | HEIGHT: 66 IN | DIASTOLIC BLOOD PRESSURE: 70 MMHG | BODY MASS INDEX: 19.06 KG/M2 | WEIGHT: 118.6 LBS | SYSTOLIC BLOOD PRESSURE: 102 MMHG | RESPIRATION RATE: 18 BRPM | TEMPERATURE: 98.1 F | HEART RATE: 85 BPM

## 2019-11-14 DIAGNOSIS — Z00.00 ANNUAL PHYSICAL EXAM: ICD-10-CM

## 2019-11-14 DIAGNOSIS — R91.8 ABNORMAL CT SCAN OF LUNG: ICD-10-CM

## 2019-11-14 LAB
25(OH)D3 SERPL-MCNC: 12 NG/ML (ref 30–100)
ALBUMIN SERPL BCP-MCNC: 4.7 G/DL (ref 3.2–4.9)
ALBUMIN/GLOB SERPL: 1.4 G/DL
ALP SERPL-CCNC: 50 U/L (ref 30–99)
ALT SERPL-CCNC: 15 U/L (ref 2–50)
ANION GAP SERPL CALC-SCNC: 10 MMOL/L (ref 0–11.9)
AST SERPL-CCNC: 20 U/L (ref 12–45)
BASOPHILS # BLD AUTO: 0.9 % (ref 0–1.8)
BASOPHILS # BLD: 0.05 K/UL (ref 0–0.12)
BILIRUB SERPL-MCNC: 0.6 MG/DL (ref 0.1–1.5)
BUN SERPL-MCNC: 12 MG/DL (ref 8–22)
CALCIUM SERPL-MCNC: 9.5 MG/DL (ref 8.5–10.5)
CHLORIDE SERPL-SCNC: 105 MMOL/L (ref 96–112)
CHOLEST SERPL-MCNC: 175 MG/DL (ref 100–199)
CO2 SERPL-SCNC: 23 MMOL/L (ref 20–33)
CREAT SERPL-MCNC: 0.52 MG/DL (ref 0.5–1.4)
EOSINOPHIL # BLD AUTO: 0.04 K/UL (ref 0–0.51)
EOSINOPHIL NFR BLD: 0.7 % (ref 0–6.9)
ERYTHROCYTE [DISTWIDTH] IN BLOOD BY AUTOMATED COUNT: 39.8 FL (ref 35.9–50)
EST. AVERAGE GLUCOSE BLD GHB EST-MCNC: 103 MG/DL
FERRITIN SERPL-MCNC: 69.1 NG/ML (ref 10–291)
FOLATE SERPL-MCNC: >22.4 NG/ML
GLOBULIN SER CALC-MCNC: 3.3 G/DL (ref 1.9–3.5)
GLUCOSE SERPL-MCNC: 80 MG/DL (ref 65–99)
HBA1C MFR BLD: 5.2 % (ref 0–5.6)
HCT VFR BLD AUTO: 41.4 % (ref 37–47)
HDLC SERPL-MCNC: 61 MG/DL
HGB BLD-MCNC: 13.7 G/DL (ref 12–16)
IMM GRANULOCYTES # BLD AUTO: 0.02 K/UL (ref 0–0.11)
IMM GRANULOCYTES NFR BLD AUTO: 0.4 % (ref 0–0.9)
IRON SERPL-MCNC: 112 UG/DL (ref 40–170)
LDLC SERPL CALC-MCNC: 102 MG/DL
LYMPHOCYTES # BLD AUTO: 1.94 K/UL (ref 1–4.8)
LYMPHOCYTES NFR BLD: 34.6 % (ref 22–41)
MCH RBC QN AUTO: 30.4 PG (ref 27–33)
MCHC RBC AUTO-ENTMCNC: 33.1 G/DL (ref 33.6–35)
MCV RBC AUTO: 91.8 FL (ref 81.4–97.8)
MONOCYTES # BLD AUTO: 0.7 K/UL (ref 0–0.85)
MONOCYTES NFR BLD AUTO: 12.5 % (ref 0–13.4)
NEUTROPHILS # BLD AUTO: 2.86 K/UL (ref 2–7.15)
NEUTROPHILS NFR BLD: 50.9 % (ref 44–72)
NRBC # BLD AUTO: 0 K/UL
NRBC BLD-RTO: 0 /100 WBC
PLATELET # BLD AUTO: 262 K/UL (ref 164–446)
PMV BLD AUTO: 10 FL (ref 9–12.9)
POTASSIUM SERPL-SCNC: 4 MMOL/L (ref 3.6–5.5)
PROT SERPL-MCNC: 8 G/DL (ref 6–8.2)
RBC # BLD AUTO: 4.51 M/UL (ref 4.2–5.4)
SODIUM SERPL-SCNC: 138 MMOL/L (ref 135–145)
T4 FREE SERPL-MCNC: 0.94 NG/DL (ref 0.53–1.43)
TRIGL SERPL-MCNC: 58 MG/DL (ref 0–149)
TSH SERPL DL<=0.005 MIU/L-ACNC: 1.96 UIU/ML (ref 0.38–5.33)
VIT B12 SERPL-MCNC: 639 PG/ML (ref 211–911)
WBC # BLD AUTO: 5.6 K/UL (ref 4.8–10.8)

## 2019-11-14 PROCEDURE — 99214 OFFICE O/P EST MOD 30 MIN: CPT | Performed by: NURSE PRACTITIONER

## 2019-11-14 PROCEDURE — 84443 ASSAY THYROID STIM HORMONE: CPT

## 2019-11-14 PROCEDURE — 85025 COMPLETE CBC W/AUTO DIFF WBC: CPT

## 2019-11-14 PROCEDURE — 82607 VITAMIN B-12: CPT

## 2019-11-14 PROCEDURE — 80053 COMPREHEN METABOLIC PANEL: CPT

## 2019-11-14 PROCEDURE — 82728 ASSAY OF FERRITIN: CPT

## 2019-11-14 PROCEDURE — 83540 ASSAY OF IRON: CPT

## 2019-11-14 PROCEDURE — 80061 LIPID PANEL: CPT

## 2019-11-14 PROCEDURE — 84439 ASSAY OF FREE THYROXINE: CPT

## 2019-11-14 PROCEDURE — 83036 HEMOGLOBIN GLYCOSYLATED A1C: CPT

## 2019-11-14 PROCEDURE — 82746 ASSAY OF FOLIC ACID SERUM: CPT

## 2019-11-14 PROCEDURE — 36415 COLL VENOUS BLD VENIPUNCTURE: CPT

## 2019-11-14 PROCEDURE — 82306 VITAMIN D 25 HYDROXY: CPT

## 2019-11-14 RX ORDER — PHENAZOPYRIDINE HYDROCHLORIDE 200 MG/1
200 TABLET, FILM COATED ORAL 3 TIMES DAILY PRN
COMMUNITY
End: 2019-11-22 | Stop reason: SDUPTHER

## 2019-11-14 RX ORDER — CEFDINIR 300 MG/1
300 CAPSULE ORAL 2 TIMES DAILY
COMMUNITY
Start: 2019-11-13 | End: 2019-11-20

## 2019-11-14 RX ORDER — IBUPROFEN 400 MG/1
400 TABLET ORAL EVERY 8 HOURS PRN
COMMUNITY
End: 2022-04-27

## 2019-11-14 ASSESSMENT — PATIENT HEALTH QUESTIONNAIRE - PHQ9: CLINICAL INTERPRETATION OF PHQ2 SCORE: 0

## 2019-11-14 NOTE — LETTER
[facility]  MAHESH Orantes  [address]  [place] [address]  Fax: [phone]   Authorization for Release/Disclosure of   Protected Health Information   Name: DOLORES COWAN : 1990 SSN: [id number]   Address: [address] [place] [address] [place] [address] Phone:    There are no phone numbers on file.   I authorize the entity listed below to release/disclose the PHI below to:   [facility]/MAHESH Orantes and MAHESH Orantes   Provider or Entity Name:  [facility], [facility]   City, State, [facility]   Phone:      Fax:     Reason for request: continuity of care   Information to be released:    [  ] LAST COLONOSCOPY,  including any PATH REPORT and follow-up  [  ] LAST FIT/COLOGUARD RESULT [  ] LAST DEXA  [  ] LAST MAMMOGRAM  [  ] LAST PAP  [  ] LAST LABS [  ] RETINA EXAM REPORT  [  ] IMMUNIZATION RECORDS  [ X ] Release all info      [  ] Check here and initial the line next to each item to release ALL health information INCLUDING  _____ Care and treatment for drug and / or alcohol abuse  _____ HIV testing, infection status, or AIDS  _____ Genetic Testing    DATES OF SERVICE OR TIME PERIOD TO BE DISCLOSED: _____________  I understand and acknowledge that:  * This Authorization may be revoked at any time by you in writing, except if your health information has already been used or disclosed.  * Your health information that will be used or disclosed as a result of you signing this authorization could be re-disclosed by the recipient. If this occurs, your re-disclosed health information may no longer be protected by State or Federal laws.  * You may refuse to sign this Authorization. Your refusal will not affect your ability to obtain treatment.  * This Authorization becomes effective upon signing and will  on (date) __________.      If no date is indicated, this Authorization will  one (1) year from the signature date.    Name: Dolores  Veena Hancock    Signature:   Date:     11/14/2019       PLEASE FAX REQUESTED RECORDS BACK TO: [phone]

## 2019-11-14 NOTE — PROGRESS NOTES
"Chief Complaint   Patient presents with   • Requesting Labs     Wanting imaging for possible lung nodule       HISTORY OF PRESENT ILLNESS: Patient is a 29 y.o. female, established patient who presents today to discuss medical problems as listed below:    Health Maintenance:  COMPLETED.    Abnormal CT scan of lung  New patient here to establish care.  Reports blood in urine and right flank pain (resolved now) for which she was seen at [place] ED on Tuesday, diagnosed with kidney infection (on cefdinir 7-day course). Hx of hydronephrosis in 2015 during last pregnancy. CT of the kidneys was performed, with incidental finding of \" enlarged lymph nodes in the lower lungs\", per patient.  She was recommended to follow-up as noted nodules.  Patient denies fevers, unplanned weight loss, fatigue or generalized weakness.  Denies cough, no blood in sputum, denies chest pain, dyspnea.  Patient admits to inhaling marijuana. History of smoking for 12 years, on and off, now down to 1 to 2 cigarettes every other day, distant history of EtOH.   Patient states this finding is giving her anxiety and she would like to to get evaluated as soon as possible.       Patient Active Problem List    Diagnosis Date Noted   • Annual physical exam 11/14/2019   • Abnormal CT scan of lung 11/14/2019   • History of endometrial ablation 07/16/2019   • Hx of tubal ligation 07/16/2019        Allergies: Patient has no known allergies.    Current Outpatient Medications   Medication Sig Dispense Refill   • cefdinir (OMNICEF) 300 MG Cap Take 300 mg by mouth 2 times a day.     • ibuprofen (MOTRIN) 400 MG Tab Take 400 mg by mouth every 8 hours as needed.     • phenazopyridine (PYRIDIUM) 200 MG Tab Take 200 mg by mouth 3 times a day as needed.     • albuterol 108 (90 BASE) MCG/ACT Aero Soln inhalation aerosol Inhale 2 Puffs by mouth every 6 hours as needed for Shortness of Breath. (Patient not taking: Reported on 3/13/2018) 8.5 g 0     No current " facility-administered medications for this visit.        Social History     Tobacco Use   • Smoking status: Light Tobacco Smoker     Packs/day: 0.00     Years: 12.00     Pack years: 0.00     Types: Cigarettes   • Smokeless tobacco: Never Used   • Tobacco comment: Pt states smokes every other day    Substance Use Topics   • Alcohol use: No     Comment: socially    • Drug use: Yes     Types: Marijuana     Social History     Patient does not qualify to have social determinant information on file (likely too young).   Social History Narrative   • Not on file       Family History   Problem Relation Age of Onset   • No Known Problems Mother    • No Known Problems Father    • Blood Clots Sister         contra   • Arthritis Maternal Grandmother    • Hypertension Maternal Grandmother    • No Known Problems Maternal Grandfather    • Depression Paternal Grandmother    • No Known Problems Sister    • Cancer Maternal Aunt         bone       Allergies, past medical history, past surgical history, family history, social history reviewed and updated.    Review of Systems:      - Constitutional: Negative for fever, chills, unexpected weight change, and fatigue/generalized weakness.     - HEENT: Negative for headaches, vision changes, hearing changes, ear pain, ear discharge, rhinorrhea, sinus congestion, sore throat, and neck pain.      - Respiratory: Negative for cough, sputum production, chest congestion, dyspnea, wheezing, and crackles.      - Cardiovascular: Negative for chest pain, palpitations, orthopnea, and bilateral lower extremity edema.     - Gastrointestinal: Negative for heartburn, nausea, vomiting, abdominal pain, hematochezia, melena, diarrhea, constipation, and greasy/foul-smelling stools.     - Genitourinary: Negative for dysuria, polyuria, hematuria, pyuria, urinary urgency, and urinary incontinence.    - Musculoskeletal: Negative for myalgias, back pain, and joint pain.     - Skin: Negative for rash, itching,  "cyanotic skin color change.     - Neurological: Negative for dizziness, tingling, tremors, focal sensory deficit, focal weakness and headaches.     - Endo/Heme/Allergies: Does not bruise/bleed easily.     - Psychiatric/Behavioral: Negative for depression, suicidal/homicidal ideation and memory loss.      All other systems reviewed and are negative    Exam:    /70   Pulse 85   Temp 36.7 °C (98.1 °F) (Temporal)   Resp 18   Ht 1.676 m (5' 6\")   Wt 53.8 kg (118 lb 9.6 oz)   SpO2 98%   BMI 19.14 kg/m²  Body mass index is 19.14 kg/m².    Physical Exam:  Constitutional: Well-developed and well-nourished. Not diaphoretic. No distress.   Skin: Skin is warm and dry. No rash noted.  Head: Atraumatic without lesions.  Eyes: Conjunctivae and extraocular motions are normal. Pupils are equal, round, and reactive to light. No scleral icterus.   Ears:  External ears unremarkable. Tympanic membranes clear and intact.  Nose: Nares patent. Septum midline. Turbinates without erythema nor edema. No discharge.   Mouth/Throat: Dentition is normal. Tongue normal. Oropharynx is clear and moist. Posterior pharynx without erythema or exudates.  Neck: Supple, trachea midline. Normal range of motion. No thyromegaly present. No lymphadenopathy--cervical or supraclavicular.  Cardiovascular: Regular rate and rhythm, S1 and S2 without murmur, rubs, or gallops.    Chest: Effort normal. Clear to auscultation throughout. No adventitious sounds.    Abdomen: Soft, non tender, and without distention. Active bowel sounds in all four quadrants. No rebound, guarding, masses or HSM.  : Negative for dysuria, polyuria, hematuria, pyuria, urinary urgency, and urinary incontinence.  Extremities: No cyanosis, clubbing, erythema, nor edema. Distal pulses intact and symmetric.   Musculoskeletal: Mild tenderness in the right flank.  All major joints AROM full in all directions without pain.  Neurological: Alert and oriented x 3. DTRs 2+/3 and " symmetric. No cranial nerve deficit. 5/5 myotomes. Sensation intact. Negative Rhomberg.  Psychiatric:  Behavior, mood, and affect are appropriate.    Assessment/Plan:  1. Annual physical exam  - CBC WITH DIFFERENTIAL; Future  - Comp Metabolic Panel; Future  - FREE THYROXINE; Future  - Lipid Profile; Future  - HEMOGLOBIN A1C; Future  - VITAMIN D,25 HYDROXY; Future  - TSH; Future  - FERRITIN; Future  - FOLATE; Future  - IRON; Future  - VITAMIN B12; Future    2. Abnormal CT scan of lung  Records from [facility] ED visit is obtained and scanned into the chart (under media).  CT scan of abdomen pelvis showing small nodules in right middle lobe and left lower lobe warranting further evaluation.  Will obtain records from [facility].  As patient was anxious about this finding, referral was placed to [facility] to further assist findings and further management.  - REFERRAL TO HEMATOLOGY ONCOLOGY Referral to? [facility]    Discussed with patient possible alternative diagnoses, pt is to take all medications as prescribed. If symptoms persist FU w/PCP, if symptoms worsen go to emergency room. If experiencing any side effects from prescribed medications reports to the office immediately or go to emergency room.  Reviewed indication, dosage, usage and potential adverse effects of prescribed medications. Reviewed risks and benefits of treatment plan. Patient verbalizes understanding of all instruction and verbally agrees to plan.    Return in about 2 weeks (around 11/28/2019).

## 2019-11-14 NOTE — ASSESSMENT & PLAN NOTE
"New patient here to establish care.  Reports blood in urine and right flank pain (resolved now) for which she was seen at [place] ED on Tuesday, diagnosed with kidney infection (on cefdinir 7-day course). Hx of hydronephrosis in 2015 during last pregnancy. CT of the kidneys was performed, with incidental finding of \" enlarged lymph nodes in the lower lungs\", per patient.  She was recommended to follow-up as noted nodules.  Patient denies fevers, unplanned weight loss, fatigue or generalized weakness.  Denies cough, no blood in sputum, denies chest pain, dyspnea.  Patient admits to inhaling marijuana. History of smoking for 12 years, on and off, now down to 1 to 2 cigarettes every other day, distant history of EtOH.   Patient states this finding is giving her anxiety and she would like to to get evaluated as soon as possible.   "

## 2019-11-20 ENCOUNTER — TELEPHONE (OUTPATIENT)
Dept: MEDICAL GROUP | Facility: PHYSICIAN GROUP | Age: 29
End: 2019-11-20

## 2019-11-20 DIAGNOSIS — E55.9 VITAMIN D DEFICIENCY: ICD-10-CM

## 2019-11-20 RX ORDER — ERGOCALCIFEROL 1.25 MG/1
50000 CAPSULE ORAL
Qty: 12 CAP | Refills: 0 | Status: SHIPPED | OUTPATIENT
Start: 2019-11-20 | End: 2022-04-27

## 2019-11-20 NOTE — TELEPHONE ENCOUNTER
The patient is calling regarding her appointment this Friday. She is wanting to know if she can do this over the phone due to the transportation issue. Please advise.    Work [phone]

## 2019-11-22 ENCOUNTER — OFFICE VISIT (OUTPATIENT)
Dept: MEDICAL GROUP | Facility: PHYSICIAN GROUP | Age: 29
End: 2019-11-22
Payer: COMMERCIAL

## 2019-11-22 VITALS
OXYGEN SATURATION: 100 % | WEIGHT: 116 LBS | HEART RATE: 78 BPM | RESPIRATION RATE: 16 BRPM | DIASTOLIC BLOOD PRESSURE: 60 MMHG | SYSTOLIC BLOOD PRESSURE: 124 MMHG | HEIGHT: 66 IN | BODY MASS INDEX: 18.64 KG/M2 | TEMPERATURE: 98.4 F

## 2019-11-22 DIAGNOSIS — R10.9 RIGHT FLANK PAIN: ICD-10-CM

## 2019-11-22 DIAGNOSIS — E55.9 VITAMIN D DEFICIENCY: ICD-10-CM

## 2019-11-22 PROCEDURE — 99214 OFFICE O/P EST MOD 30 MIN: CPT | Performed by: NURSE PRACTITIONER

## 2019-11-22 RX ORDER — PHENAZOPYRIDINE HYDROCHLORIDE 200 MG/1
200 TABLET, FILM COATED ORAL 3 TIMES DAILY PRN
Qty: 6 TAB | Refills: 0 | Status: SHIPPED | OUTPATIENT
Start: 2019-11-22 | End: 2022-04-27

## 2019-11-22 RX ORDER — ERGOCALCIFEROL 1.25 MG/1
50000 CAPSULE ORAL
Qty: 12 CAP | Refills: 1 | Status: SHIPPED | OUTPATIENT
Start: 2019-11-22 | End: 2022-04-27

## 2019-11-23 NOTE — ASSESSMENT & PLAN NOTE
New problem today.  Patient states she continues to have pain and discomfort in the right flank, the pain can be as severe as 6 out of 10.  Less than 2 weeks ago patient was treated for pyelonephritis at [facility] ED and completed a course of Cefdinir for 7 days.  States she feels much better than 2 weeks ago but still the pain has gone away.  She denies fevers, nausea vomiting diarrhea, blood in urine, abnormal vaginal discharge or odor, or abdominal pain or cramping.  Patient reports for the last 5 years she had multiple issues with UTIs and kidney infections, originally started with pregnancy 5 years ago.

## 2019-11-23 NOTE — PROGRESS NOTES
Chief Complaint   Patient presents with   • Results     CT scan - [facility]       HISTORY OF PRESENT ILLNESS: Patient is a 29 y.o. female, established patient who presents today to discuss medical problems as listed below:    Health Maintenance:  COMPLETED.    Vitamin D deficiency  Recent labs, noted decreased vitamin D at 12, patient is on a supplement.    Right flank pain  New problem today.  Patient states she continues to have pain and discomfort in the right flank, the pain can be as severe as 6 out of 10.  Less than 2 weeks ago patient was treated for pyelonephritis at [place] ED and completed a course of Cefdinir for 7 days.  States she feels much better than 2 weeks ago but still the pain has gone away.  She denies fevers, nausea vomiting diarrhea, blood in urine, abnormal vaginal discharge or odor, or abdominal pain or cramping.  Patient reports for the last 5 years she had multiple issues with UTIs and kidney infections, originally started with pregnancy 5 years ago.      Patient Active Problem List    Diagnosis Date Noted   • Right flank pain 11/22/2019   • Vitamin D deficiency 11/20/2019   • Annual physical exam 11/14/2019   • Abnormal CT scan of lung 11/14/2019   • History of endometrial ablation 07/16/2019   • Hx of tubal ligation 07/16/2019        Allergies: Patient has no known allergies.    Current Outpatient Medications   Medication Sig Dispense Refill   • vitamin D, Ergocalciferol, (DRISDOL) 34731 units Cap capsule Take 1 Cap by mouth every 7 days. 12 Cap 1   • phenazopyridine (PYRIDIUM) 200 MG Tab Take 1 Tab by mouth 3 times a day as needed. 6 Tab 0   • ibuprofen (MOTRIN) 400 MG Tab Take 400 mg by mouth every 8 hours as needed.     • vitamin D, Ergocalciferol, (DRISDOL) 01039 units Cap capsule Take 1 Cap by mouth every 7 days. 12 Cap 0   • albuterol 108 (90 BASE) MCG/ACT Aero Soln inhalation aerosol Inhale 2 Puffs by mouth every 6 hours as needed for Shortness of Breath. (Patient not taking:  "Reported on 3/13/2018) 8.5 g 0     No current facility-administered medications for this visit.        Social History     Tobacco Use   • Smoking status: Former Smoker     Packs/day: 0.00     Years: 12.00     Pack years: 0.00     Types: Cigarettes     Last attempt to quit: 10/25/2019     Years since quittin.0   • Smokeless tobacco: Never Used   • Tobacco comment: Pt states smokes every other day    Substance Use Topics   • Alcohol use: No     Comment: socially    • Drug use: Yes     Types: Marijuana     Social History     Patient does not qualify to have social determinant information on file (likely too young).   Social History Narrative   • Not on file       Family History   Problem Relation Age of Onset   • No Known Problems Mother    • No Known Problems Father    • Blood Clots Sister         contra   • Arthritis Maternal Grandmother    • Hypertension Maternal Grandmother    • No Known Problems Maternal Grandfather    • Depression Paternal Grandmother    • No Known Problems Sister    • Cancer Maternal Aunt         bone       Allergies, past medical history, past surgical history, family history, social history reviewed and updated.    Review of Systems:      - Constitutional: Negative for fever, chills, unexpected weight change, and fatigue/generalized weakness.     - Respiratory: Negative for cough, sputum production, chest congestion, dyspnea, wheezing, and crackles.      - Cardiovascular: Negative for chest pain, palpitations, orthopnea, and bilateral lower extremity edema.     - Genitourinary: Right flank pain.  Negative for dysuria, polyuria, hematuria, pyuria, urinary urgency, and urinary incontinence.    - Psychiatric/Behavioral: Positive for situational anxiety.  Negative for depression, suicidal/homicidal ideation and memory loss.      All other systems reviewed and are negative    Exam:    /60   Pulse 78   Temp 36.9 °C (98.4 °F) (Temporal)   Resp 16   Ht 1.676 m (5' 6\")   Wt 52.6 kg (116 " lb)   SpO2 100%   BMI 18.72 kg/m²  Body mass index is 18.72 kg/m².    Physical Exam:  Constitutional: Well-developed and well-nourished. Not diaphoretic. No distress.   Cardiovascular: Regular rate and rhythm, S1 and S2 without murmur, rubs, or gallops.    Chest: Effort normal. Clear to auscultation throughout. No adventitious sounds. No CVA tenderness.  Psychiatric:  Behavior, mood, and affect are appropriate.    LABS: 11/14  results reviewed and discussed with the patient, questions answered.    Patient was seen for 25 minutes face to face of which > 50% of appointment time was spent on counseling and coordination of care regarding the above.     Assessment/Plan:  1. Vitamin D deficiency  Uncontrolled, stable.  After Rx completion, take OTC vitamin D3 5000 IUs daily.  - vitamin D, Ergocalciferol, (DRISDOL) 02574 units Cap capsule; Take 1 Cap by mouth every 7 days.  Dispense: 12 Cap; Refill: 1    2. Right flank pain  Uncontrolled.  Patient to follow-up with neurology for further recommendation management.  - REFERRAL TO UROLOGY  - phenazopyridine (PYRIDIUM) 200 MG Tab; Take 1 Tab by mouth 3 times a day as needed.  Dispense: 6 Tab; Refill: 0    Discussed with patient possible alternative diagnoses, pt is to take all medications as prescribed. If symptoms persist FU w/PCP, if symptoms worsen go to emergency room. If experiencing any side effects from prescribed medications reports to the office immediately or go to emergency room.  Reviewed indication, dosage, usage and potential adverse effects of prescribed medications. Reviewed risks and benefits of treatment plan. Patient verbalizes understanding of all instruction and verbally agrees to plan.    Return if symptoms worsen or fail to improve.

## 2019-11-26 DIAGNOSIS — R91.8 ABNORMAL CT SCAN OF LUNG: ICD-10-CM

## 2020-01-27 NOTE — ED NOTES
All DC discussed for vaginitis and abdominal pain.. She has been provided a written DC plan and written prescriptions. Pt verbalized understanding of all DC instructions, prescriptions and follow up recommendations. Pt ambulated to lobby with upright and steady gait.    No

## 2020-07-08 ENCOUNTER — NURSE TRIAGE (OUTPATIENT)
Dept: HEALTH INFORMATION MANAGEMENT | Facility: OTHER | Age: 30
End: 2020-07-08

## 2020-07-08 ENCOUNTER — OFFICE VISIT (OUTPATIENT)
Dept: URGENT CARE | Facility: CLINIC | Age: 30
End: 2020-07-08
Payer: COMMERCIAL

## 2020-07-08 ENCOUNTER — HOSPITAL ENCOUNTER (OUTPATIENT)
Facility: MEDICAL CENTER | Age: 30
End: 2020-07-08
Attending: PHYSICIAN ASSISTANT
Payer: COMMERCIAL

## 2020-07-08 VITALS
DIASTOLIC BLOOD PRESSURE: 70 MMHG | HEIGHT: 62 IN | BODY MASS INDEX: 22.08 KG/M2 | TEMPERATURE: 98.6 F | WEIGHT: 120 LBS | HEART RATE: 78 BPM | OXYGEN SATURATION: 97 % | RESPIRATION RATE: 16 BRPM | SYSTOLIC BLOOD PRESSURE: 122 MMHG

## 2020-07-08 DIAGNOSIS — J03.90 TONSILLITIS: ICD-10-CM

## 2020-07-08 DIAGNOSIS — Z20.822 EXPOSURE TO COVID-19 VIRUS: ICD-10-CM

## 2020-07-08 LAB
HETEROPH AB SER QL LA: NEGATIVE
INT CON NEG: NORMAL
INT CON NEG: NORMAL
INT CON POS: NORMAL
INT CON POS: NORMAL
S PYO AG THROAT QL: NEGATIVE

## 2020-07-08 PROCEDURE — U0003 INFECTIOUS AGENT DETECTION BY NUCLEIC ACID (DNA OR RNA); SEVERE ACUTE RESPIRATORY SYNDROME CORONAVIRUS 2 (SARS-COV-2) (CORONAVIRUS DISEASE [COVID-19]), AMPLIFIED PROBE TECHNIQUE, MAKING USE OF HIGH THROUGHPUT TECHNOLOGIES AS DESCRIBED BY CMS-2020-01-R: HCPCS

## 2020-07-08 PROCEDURE — 99213 OFFICE O/P EST LOW 20 MIN: CPT | Performed by: PHYSICIAN ASSISTANT

## 2020-07-08 PROCEDURE — 86308 HETEROPHILE ANTIBODY SCREEN: CPT | Performed by: PHYSICIAN ASSISTANT

## 2020-07-08 PROCEDURE — 87880 STREP A ASSAY W/OPTIC: CPT | Performed by: PHYSICIAN ASSISTANT

## 2020-07-08 RX ORDER — TRAMADOL HYDROCHLORIDE 50 MG/1
TABLET ORAL
COMMUNITY
Start: 2020-06-18 | End: 2022-04-27

## 2020-07-08 RX ORDER — PODOFILOX 5 MG/ML
SOLUTION TOPICAL
COMMUNITY
Start: 2020-07-01 | End: 2022-04-27

## 2020-07-08 ASSESSMENT — FIBROSIS 4 INDEX: FIB4 SCORE: 0.57

## 2020-07-08 NOTE — TELEPHONE ENCOUNTER
1. Caller Name: Alicia                 Call Back Number: [phone]  [facility] PCP or Specialty Provider: Yes Ligia        2.  In the last two weeks, has the patient had any new or worsening symptoms (not explained by alternative diagnosis)? No.    3.  Does patient have any comoribidities? None     4.  Has the patient traveled in the last 14 days OR had any known contact with someone who is suspected or confirmed to have COVID-19?  No.    5. Disposition: Cleared by RN Triage as potential is low for COVID-19; OK to keep/schedule appointment Transferred to scheduling for UC visit today.      Note routed to [facility] Provider: [facility] only.   Swollen RIGHT tonsil that comes and goes since last Thursday.  She also has a wisdom tooth that is coming in on the RIGHT side also.  Patient is reporting anxiety over tonsil edema.  Her kids will be back with her this weekend and she does not want to be contagious    Reason for Disposition  • Patient wants to be seen    Additional Information  • Negative: SEVERE difficulty breathing (e.g., struggling for each breath, speaks in single words)  • Negative: Sounds like a life-threatening emergency to the triager  • Negative: Throat culture results, call about  • Negative: Productive cough is the main symptom  • Negative: Runny nose is the main symptom  • Negative: Drooling or spitting out saliva (because can't swallow)  • Negative: Unable to open mouth completely  • Negative: Drinking very little and has signs of dehydration (e.g., no urine > 12 hours, very dry mouth, very lightheaded)  • Negative: Patient sounds very sick or weak to the triager  • Negative: Difficulty breathing (per caller) but not severe  • Negative: Fever > 103 F (39.4 C)  • Negative: Refuses to drink anything for > 12 hours  • Negative: History of rheumatic fever  • Negative: Diabetes mellitus or weak immune system (e.g., HIV positive, cancer chemo, splenectomy, organ transplant, chronic steroids)  • Negative:  "Widespread rash (especially chest and abdomen)  • Negative: Earache also present  • Negative: Pus on tonsils (back of throat) and swollen neck lymph nodes ('glands')  • Negative: SEVERE sore throat pain    Answer Assessment - Initial Assessment Questions  1. ONSET: \"When did the throat start hurting?\" (Hours or days ago)       Started last Thursday  2. SEVERITY: \"How bad is the sore throat?\" (Scale 1-10; mild, moderate or severe)    - MILD (1-3):  doesn't interfere with eating or normal activities    - MODERATE (4-7): interferes with eating some solids and normal activities    - SEVERE (8-10):  excruciating pain, interferes with most normal activities    - SEVERE DYSPHAGIA: can't swallow liquids, drooling      Mild. Feels pressure not pain  3. STREP EXPOSURE: \"Has there been any exposure to strep within the past week?\" If so, ask: \"What type of contact occurred?\"       no  4.  VIRAL SYMPTOMS: \"Are there any symptoms of a cold, such as a runny nose, cough, hoarse voice or red eyes?\" nose is a little runny.    5. FEVER: \"Do you have a fever?\" If so, ask: \"What is your temperature, how was it measured, and when did it start?\"    No   6. PUS ON THE TONSILS: \"Is there pus on the tonsils in the back of your throat?\"      no  7. OTHER SYMPTOMS: \"Do you have any other symptoms?\" (e.g., difficulty breathing, headache, rash)     none  8. PREGNANCY: \"Is there any chance you are pregnant?\" \"When was your last menstrual period?\"    No Tubal ligation    Protocols used: SORE THROAT-A-OH      "

## 2020-07-09 DIAGNOSIS — Z20.822 EXPOSURE TO COVID-19 VIRUS: ICD-10-CM

## 2020-07-09 DIAGNOSIS — J03.90 TONSILLITIS: ICD-10-CM

## 2020-07-09 LAB — COVID ORDER STATUS COVID19: NORMAL

## 2020-07-10 ENCOUNTER — TELEPHONE (OUTPATIENT)
Dept: URGENT CARE | Facility: CLINIC | Age: 30
End: 2020-07-10

## 2020-07-10 LAB
SARS-COV-2 RNA RESP QL NAA+PROBE: NOTDETECTED
SPECIMEN SOURCE: NORMAL

## 2020-07-11 ASSESSMENT — ENCOUNTER SYMPTOMS
NAUSEA: 0
SORE THROAT: 1
HEADACHES: 0
COUGH: 0
VOMITING: 0
CHILLS: 0
SINUS PAIN: 0
MYALGIAS: 0
EYE PAIN: 0
PALPITATIONS: 0
ABDOMINAL PAIN: 0
DIARRHEA: 0
FEVER: 0
DIZZINESS: 0
SHORTNESS OF BREATH: 0
BLURRED VISION: 0

## 2020-07-11 NOTE — PROGRESS NOTES
Subjective:      Jessica Hancock is a 29 y.o. female who presents with Pharyngitis (x6 days )      HPI:  This is a new problem. Jessica Hancock is a 29 y.o. female who presents today for evaluation of swollen tonsils.  Patient states that her tonsils have been swollen for the past 6 days or so.  States that they are always mildly enlarged but they have been worse over the past week.  She is also noticed some mild lymph node enlargement.  States that her throat has occasionally ache but for the most part does not hurt.  She has no difficulty swallowing.  No trismus.  No difficulty moving her neck.  No fever/chills.  Denies any known exposure to anybody that has tested positive for COVID-19 virus.  No exposure to strep or mono.  States that she has not had mono in the past.  Not taking any medications for symptoms.      Review of Systems   Constitutional: Negative for chills, fever and malaise/fatigue.   HENT: Positive for sore throat. Negative for congestion, ear pain and sinus pain.    Eyes: Negative for blurred vision and pain.   Respiratory: Negative for cough and shortness of breath.    Cardiovascular: Negative for chest pain and palpitations.   Gastrointestinal: Negative for abdominal pain, diarrhea, nausea and vomiting.   Musculoskeletal: Negative for myalgias.   Skin: Negative for rash.   Neurological: Negative for dizziness and headaches.       PMH:  has a past medical history of Hydronephrosis (2/12/2014).  MEDS:   Current Outpatient Medications:   •  podofilox (CONDYLOX) 0.5 % external solution, TAKE 1 LETICIA 2 TIMES A DAY TO AFFECTED AREA X 3 DAYS. REST 4 DAYS THEN REPEAT CYCLE 4 6 TIMES, Disp: , Rfl:   •  tramadol (ULTRAM) 50 MG Tab, TAKE 1 TABLET BY MOUTH EVERY 12 HOURS AS NEEDED FOR PAIN *5DS M25.572*, Disp: , Rfl:   •  vitamin D, Ergocalciferol, (DRISDOL) 70046 units Cap capsule, Take 1 Cap by mouth every 7 days. (Patient not taking: Reported on 7/8/2020), Disp: 12 Cap, Rfl: 1  •   "phenazopyridine (PYRIDIUM) 200 MG Tab, Take 1 Tab by mouth 3 times a day as needed. (Patient not taking: Reported on 7/8/2020), Disp: 6 Tab, Rfl: 0  •  vitamin D, Ergocalciferol, (DRISDOL) 47277 units Cap capsule, Take 1 Cap by mouth every 7 days. (Patient not taking: Reported on 7/8/2020), Disp: 12 Cap, Rfl: 0  •  ibuprofen (MOTRIN) 400 MG Tab, Take 400 mg by mouth every 8 hours as needed., Disp: , Rfl:   •  albuterol 108 (90 BASE) MCG/ACT Aero Soln inhalation aerosol, Inhale 2 Puffs by mouth every 6 hours as needed for Shortness of Breath. (Patient not taking: Reported on 3/13/2018), Disp: 8.5 g, Rfl: 0  ALLERGIES: No Known Allergies  SURGHX:   Past Surgical History:   Procedure Laterality Date   • HYSTEROSCOPY NOVASURE-2 N/A 5/20/2015    Procedure: HYSTEROSCOPY NOVASURE ENDOMETRIAL ABLATION;  Surgeon: Roddy Simmons M.D.;  Location: SURGERY SAME DAY [facility] ORS;  Service:    • DILATION AND CURETTAGE N/A 5/20/2015    Procedure: DILATION AND CURETTAGE;  Surgeon: Roddy Simmons M.D.;  Location: SURGERY SAME DAY [facility] ORS;  Service:    • TUBAL COAGULATION LAPAROSCOPIC BILATERAL Bilateral 5/20/2015    Procedure: TUBAL COAGULATION LAPAROSCOPIC BILATERAL;  Surgeon: Roddy Simmons M.D.;  Location: SURGERY SAME DAY [facility] ORS;  Service:      SOCHX:  reports that she quit smoking about 8 months ago. Her smoking use included cigarettes. She smoked 0.00 packs per day for 12.00 years. She has never used smokeless tobacco. She reports current drug use. Drug: Marijuana. She reports that she does not drink alcohol.  FH: Family history was reviewed, no pertinent findings to report     Objective:     /70   Pulse 78   Temp 37 °C (98.6 °F) (Temporal)   Resp 16   Ht 1.575 m (5' 2\")   Wt 54.4 kg (120 lb)   SpO2 97%   BMI 21.95 kg/m²      Physical Exam  Constitutional:       Appearance: She is well-developed.   HENT:      Head: Normocephalic and atraumatic.      Right Ear: Tympanic membrane, ear canal and " external ear normal.      Left Ear: Tympanic membrane, ear canal and external ear normal.      Nose: Nose normal.      Mouth/Throat:      Lips: Pink.      Mouth: Mucous membranes are moist.      Pharynx: Uvula midline. No oropharyngeal exudate or posterior oropharyngeal erythema.      Tonsils: No tonsillar abscesses. 2+ on the right. 2+ on the left.   Eyes:      Conjunctiva/sclera: Conjunctivae normal.      Pupils: Pupils are equal, round, and reactive to light.   Neck:      Musculoskeletal: Normal range of motion.   Cardiovascular:      Rate and Rhythm: Normal rate and regular rhythm.      Heart sounds: Normal heart sounds. No murmur.   Pulmonary:      Effort: Pulmonary effort is normal.      Breath sounds: Normal breath sounds. No decreased breath sounds, wheezing, rhonchi or rales.   Lymphadenopathy:      Cervical: Cervical adenopathy (non tender) present.   Skin:     General: Skin is warm and dry.      Capillary Refill: Capillary refill takes less than 2 seconds.   Neurological:      Mental Status: She is alert and oriented to person, place, and time.   Psychiatric:         Behavior: Behavior normal.         Judgment: Judgment normal.         POCT Rapid Strep A - Negative    POCT Mononucleosis (mono) - Negative  Assessment/Plan:       1. Tonsillitis  - POCT Rapid Strep A  - POCT Mononucleosis (mono)  - COVID/SARS COV-2 PCR; Future  -Supportive care discussed to include salt water gargles, throat lozenges, and increased fluid intake    2. Exposure to COVID-19 virus  - COVID/SARS COV-2 PCR; Future        *Patient had a nasal swab to test for COVID-19 virus.  Note was provided for work while the results are pending.  Patient was advised to stay home and self isolate/self quarantine while awaiting the results.  Supportive care was reiterated.  Return/ER precautions discussed.  Discussed that if results are positive we will need to extend the work note for a longer period of time.        Differential Diagnosis,  natural history, and supportive care discussed. Return to the Urgent Care or follow up with your PCP if symptoms fail to resolve, or for any new or worsening symptoms. Emergency room precautions discussed. Patient and/or family appears understanding of information.        Addendum 7/11/2020: COVID results came back negative.  Patient has questions about what she should do due to her tonsillar enlargement.  Still has no sore throat.  No new symptoms.  Advised that she can follow-up with an ENT.  States that with her insurance she does not need a referral.  She will try herself to call some offices on Monday.

## 2020-07-11 NOTE — TELEPHONE ENCOUNTER
Called to inform of negative COVID result. Pt asked what her options are since all results are negative, noted that she has a PCP appt in August. Informed pt that (if her insurance allows it) she might not  Need a referral for ENT, encouraged her to find a provider on her insurance website.     Instructed pt to return if symptoms worsen or persist and she is unable to get an appt. Pt had no further questions.

## 2020-08-07 ENCOUNTER — APPOINTMENT (OUTPATIENT)
Dept: MEDICAL GROUP | Facility: MEDICAL CENTER | Age: 30
End: 2020-08-07
Payer: COMMERCIAL

## 2022-04-27 ENCOUNTER — OFFICE VISIT (OUTPATIENT)
Dept: URGENT CARE | Facility: CLINIC | Age: 32
End: 2022-04-27
Payer: COMMERCIAL

## 2022-04-27 ENCOUNTER — HOSPITAL ENCOUNTER (OUTPATIENT)
Dept: RADIOLOGY | Facility: MEDICAL CENTER | Age: 32
End: 2022-04-27
Attending: NURSE PRACTITIONER
Payer: COMMERCIAL

## 2022-04-27 ENCOUNTER — HOSPITAL ENCOUNTER (OUTPATIENT)
Facility: MEDICAL CENTER | Age: 32
End: 2022-04-27
Attending: NURSE PRACTITIONER
Payer: COMMERCIAL

## 2022-04-27 VITALS
BODY MASS INDEX: 22.45 KG/M2 | SYSTOLIC BLOOD PRESSURE: 100 MMHG | OXYGEN SATURATION: 94 % | RESPIRATION RATE: 18 BRPM | HEIGHT: 62 IN | WEIGHT: 122 LBS | HEART RATE: 105 BPM | TEMPERATURE: 99.1 F | DIASTOLIC BLOOD PRESSURE: 60 MMHG

## 2022-04-27 DIAGNOSIS — R10.9 FLANK PAIN: ICD-10-CM

## 2022-04-27 DIAGNOSIS — A08.4 VIRAL GASTROENTERITIS: ICD-10-CM

## 2022-04-27 DIAGNOSIS — R11.0 NAUSEA: ICD-10-CM

## 2022-04-27 LAB
APPEARANCE UR: CLEAR
BILIRUB UR STRIP-MCNC: NORMAL MG/DL
COLOR UR AUTO: YELLOW
GLUCOSE UR STRIP.AUTO-MCNC: NORMAL MG/DL
INT CON NEG: NEGATIVE
INT CON POS: POSITIVE
KETONES UR STRIP.AUTO-MCNC: NORMAL MG/DL
LEUKOCYTE ESTERASE UR QL STRIP.AUTO: NORMAL
NITRITE UR QL STRIP.AUTO: NEGATIVE
PH UR STRIP.AUTO: 6 [PH] (ref 5–8)
POC URINE PREGNANCY TEST: NEGATIVE
PROT UR QL STRIP: NORMAL MG/DL
RBC UR QL AUTO: NORMAL
SP GR UR STRIP.AUTO: >=1.03
UROBILINOGEN UR STRIP-MCNC: NORMAL MG/DL

## 2022-04-27 PROCEDURE — 81025 URINE PREGNANCY TEST: CPT | Performed by: NURSE PRACTITIONER

## 2022-04-27 PROCEDURE — 87086 URINE CULTURE/COLONY COUNT: CPT

## 2022-04-27 PROCEDURE — 99214 OFFICE O/P EST MOD 30 MIN: CPT | Performed by: NURSE PRACTITIONER

## 2022-04-27 PROCEDURE — 81002 URINALYSIS NONAUTO W/O SCOPE: CPT | Performed by: NURSE PRACTITIONER

## 2022-04-27 PROCEDURE — 76775 US EXAM ABDO BACK WALL LIM: CPT

## 2022-04-27 RX ORDER — ONDANSETRON 4 MG/1
4 TABLET, ORALLY DISINTEGRATING ORAL EVERY 6 HOURS PRN
Qty: 10 TABLET | Refills: 0 | Status: SHIPPED | OUTPATIENT
Start: 2022-04-27

## 2022-04-27 RX ORDER — ACYCLOVIR 800 MG/1
TABLET ORAL
COMMUNITY
Start: 2022-02-28

## 2022-04-27 RX ORDER — CLOBETASOL PROPIONATE 0.5 MG/G
CREAM TOPICAL
COMMUNITY
Start: 2022-04-21

## 2022-04-27 NOTE — LETTER
April 27, 2022         Patient: Jessica Hancock   YOB: 1990   Date of Visit: 4/27/2022           To Whom it May Concern:    Jessica Hancock was seen in my clinic on 4/27/2022. She may be excused from work this week due to illness.     If you have any questions or concerns, please don't hesitate to call.        Sincerely,           BROOK Lockwood.  Electronically Signed

## 2022-04-27 NOTE — PROGRESS NOTES
Chief Complaint   Patient presents with   • Nausea     Starting Saturday, did vomit on Saturday, not able to eat much, having diarrhea, right flank pain         HISTORY OF PRESENT ILLNESS: Patient is a pleasant 31 y.o. female who presents to urgent care today with GI complaints.  Notes that for the past 4 days she has had nausea, vomiting, diarrhea.  She admits to intermittent right-sided flank and mid abdominal pain.  She does report malaise, fatigue, loss of appetite.  Her flank and abdominal pain has since improved but does have a history of pyelonephritis and would like evaluation of her urine.  She denies any urinary symptoms.  Denies any history of renal colic.  Her children were recently ill with similar GI symptoms, have since improved.  She has not tried a medication for symptom relief.    Patient Active Problem List    Diagnosis Date Noted   • Right flank pain 2019   • Vitamin D deficiency 2019   • Annual physical exam 2019   • Abnormal CT scan of lung 2019   • History of endometrial ablation 2019   • Hx of tubal ligation 2019       Allergies:Patient has no known allergies.    Current Outpatient Medications Ordered in Epic   Medication Sig Dispense Refill   • ondansetron (ZOFRAN ODT) 4 MG TABLET DISPERSIBLE Take 1 Tablet by mouth every 6 hours as needed for Nausea. 10 Tablet 0   • acyclovir (ZOVIRAX) 800 MG Tab TAKE 1 TAB BY MOUTH 4 TIMES A DAY FOR 5 DAYS     • clobetasol (TEMOVATE) 0.05 % Cream 0.5/HALF GM ON THE SKIN DAILY       No current [facility]-ordered facility-administered medications on file.       Past Medical History:   Diagnosis Date   • Hydronephrosis 2014       Social History     Tobacco Use   • Smoking status: Former Smoker     Packs/day: 0.00     Years: 12.00     Pack years: 0.00     Types: Cigarettes     Quit date: 10/25/2019     Years since quittin.5   • Smokeless tobacco: Never Used   • Tobacco comment: Pt states smokes every other day    Vaping  "Use   • Vaping Use: Never used   Substance Use Topics   • Alcohol use: No     Comment: socially    • Drug use: Yes     Types: Marijuana       Family Status   Relation Name Status   • Mo  Alive   • Fa  Alive   • Sis  Alive   • MGMo  Alive   • MGFa  Alive   • PGMo  Alive   • PGFa     • Sis  Alive   • MAunt  (Not Specified)     Family History   Problem Relation Age of Onset   • No Known Problems Mother    • No Known Problems Father    • Blood Clots Sister         contra   • Arthritis Maternal Grandmother    • Hypertension Maternal Grandmother    • No Known Problems Maternal Grandfather    • Depression Paternal Grandmother    • No Known Problems Sister    • Cancer Maternal Aunt         bone       ROS:  Review of Systems   Constitutional: Positive for malaise, fatigue.  Negative for fever, chills, weight loss.    HENT: Negative for ear pain, nosebleeds, congestion, sore throat and neck pain.    Eyes: Negative for vision changes.   Neuro: Negative for headache, sensory changes, weakness, seizure, LOC.   Cardiovascular: Negative for chest pain, palpitations, orthopnea and leg swelling.   Respiratory: Negative for cough, sputum production, shortness of breath and wheezing.   Gastrointestinal: Positive for abdominal pain, nausea, vomiting and diarrhea.   Genitourinary: Positive for flank pain.  Negative for dysuria, urgency and frequency.  Musculoskeletal: Negative for falls, neck pain, back pain, joint pain, myalgias.   Skin: Negative for rash, diaphoresis.     Exam:  /60   Pulse (!) 105   Temp 37.3 °C (99.1 °F) (Temporal)   Resp 18   Ht 1.575 m (5' 2\")   Wt 55.3 kg (122 lb)   SpO2 94%   General: well-nourished, well-developed female in NAD  Head: normocephalic, atraumatic  Eyes: PERRLA, no conjunctival injection, acuity grossly intact, lids normal.  Ears: normal shape and symmetry, no tenderness, no discharge. External canals are without any significant edema or erythema. Tympanic membranes are without " "any inflammation, no effusion. Gross auditory acuity is intact.  Nose: symmetrical without tenderness, no discharge.  Mouth/Throat: reasonable hygiene, no erythema, exudates or tonsillar enlargement.  Neck: no masses, range of motion within normal limits, no tracheal deviation. No obvious thyroid enlargement.   Lymph: no cervical adenopathy. No supraclavicular adenopathy.   Neuro: alert and oriented. Cranial nerves 1-12 grossly intact. No sensory deficit.   Cardiovascular: regular rate and rhythm. No edema.  Pulmonary: no distress. Chest is symmetrical with respiration, no wheezes, crackles, or rhonchi.   Abdomen: soft, no significant tenderness, no guarding, no hepatosplenomegaly.  No CVA tenderness.  Musculoskeletal: no clubbing, appropriate muscle tone, gait is stable.  Skin: warm, dry, intact, no clubbing, no cyanosis, no rashes.   Psych: appropriate mood, affect, judgement.       POC urine positive for glucose, bilirubin, ketone, blood, protein, leukocytes    POC pregnancy negative    Renal ultrasound radiology reading \"Normal renal ultrasound.\"      Assessment/Plan:  1. Viral gastroenteritis     2. Flank pain  POCT Urinalysis    POCT Pregnancy    US-RENAL    URINE CULTURE(NEW)   3. Nausea  ondansetron (ZOFRAN ODT) 4 MG TABLET DISPERSIBLE       The patient is a pleasant 31-year-old female who presents to urgent care today with GI symptoms.  Urine today is negative for pregnancy.  Urine is positive for glucose, bilirubin, ketones, blood, protein, and leukocytes.  Patient denies any urinary symptoms but has had recent flank pain, experienced 2 days ago.  Renal ultrasound is performed which is negative.  Urine will be sent for culture although my suspicion for urinary process is low.  I do suspect viral gastroenteritis as her children recently had similar symptoms.  Zofran as directed, increase fluid intake, diet as tolerated.  Strict ER precautions advised.  Supportive care, differential diagnoses, and " indications for immediate follow-up discussed with patient.   Pathogenesis of diagnosis discussed including typical length and natural progression.   Instructed to return to clinic or nearest emergency department for any change in condition, further concerns, or worsening of symptoms.  Patient states understanding of the plan of care and discharge instructions.  Instructed to make an appointment, for follow up, with her primary care provider.        Please note that this dictation was created using voice recognition software. I have made every reasonable attempt to correct obvious errors, but I expect that there are errors of grammar and possibly content that I did not discover before finalizing the note.      BROOK Lockwood.

## 2022-04-29 LAB
BACTERIA UR CULT: NORMAL
SIGNIFICANT IND 70042: NORMAL
SITE SITE: NORMAL
SOURCE SOURCE: NORMAL

## 2024-03-24 ENCOUNTER — OFFICE VISIT (OUTPATIENT)
Dept: URGENT CARE | Facility: PHYSICIAN GROUP | Age: 34
End: 2024-03-24

## 2024-03-24 VITALS
TEMPERATURE: 98.6 F | DIASTOLIC BLOOD PRESSURE: 60 MMHG | RESPIRATION RATE: 16 BRPM | WEIGHT: 125 LBS | SYSTOLIC BLOOD PRESSURE: 120 MMHG | OXYGEN SATURATION: 97 % | BODY MASS INDEX: 23 KG/M2 | HEART RATE: 73 BPM | HEIGHT: 62 IN

## 2024-03-24 DIAGNOSIS — J01.00 ACUTE NON-RECURRENT MAXILLARY SINUSITIS: ICD-10-CM

## 2024-03-24 PROCEDURE — 3078F DIAST BP <80 MM HG: CPT | Performed by: FAMILY MEDICINE

## 2024-03-24 PROCEDURE — 3074F SYST BP LT 130 MM HG: CPT | Performed by: FAMILY MEDICINE

## 2024-03-24 PROCEDURE — 99213 OFFICE O/P EST LOW 20 MIN: CPT | Performed by: FAMILY MEDICINE

## 2024-03-24 RX ORDER — AMOXICILLIN AND CLAVULANATE POTASSIUM 875; 125 MG/1; MG/1
1 TABLET, FILM COATED ORAL 2 TIMES DAILY
Qty: 14 TABLET | Refills: 0 | Status: SHIPPED | OUTPATIENT
Start: 2024-03-24 | End: 2024-03-31

## 2024-03-24 RX ORDER — FLUTICASONE PROPIONATE 50 MCG
1 SPRAY, SUSPENSION (ML) NASAL 2 TIMES DAILY
Qty: 16 G | Refills: 0 | Status: SHIPPED | OUTPATIENT
Start: 2024-03-24 | End: 2024-03-31

## 2024-03-24 ASSESSMENT — FIBROSIS 4 INDEX: FIB4 SCORE: 0.47

## 2024-03-24 NOTE — PROGRESS NOTES
CC:  cough        Cough  This is a new problem. The current episode started 7 days ago. The problem has been unchanged. The problem occurs constantly. The cough is dry. Associated symptoms include : fatigue, headache, sinus congestion, nasal d/c,  Subj.  fever. Pertinent negatives include no    , nausea, vomiting, diarrhea, sweats, weight loss or wheezing. Nothing aggravates the symptoms.  Patient has tried nothing for the symptoms. There is no history of asthma.              Past Medical History:   Diagnosis Date    Hydronephrosis 2014         Social History     Socioeconomic History    Marital status:      Spouse name: Not on file    Number of children: Not on file    Years of education: Not on file    Highest education level: Not on file   Occupational History    Occupation: office   Tobacco Use    Smoking status: Former     Current packs/day: 0.00     Types: Cigarettes     Quit date: 10/25/2007     Years since quittin.4    Smokeless tobacco: Never    Tobacco comments:     Pt states smokes every other day    Vaping Use    Vaping Use: Never used   Substance and Sexual Activity    Alcohol use: Yes     Comment: socially     Drug use: Not Currently     Types: Marijuana    Sexual activity: Yes     Partners: Male     Birth control/protection: Surgical   Other Topics Concern    Not on file   Social History Narrative    Not on file     Social Determinants of Health     Financial Resource Strain: Not on file   Food Insecurity: Not on file   Transportation Needs: Not on file   Physical Activity: Not on file   Stress: Not on file   Social Connections: Not on file   Intimate Partner Violence: Not on file   Housing Stability: Not on file                 Review of Systems   Constitutional: Negative for   chills and malaise/fatigue.   Eyes: Negative for vision changes, d/c.    Respiratory: + for cough and sputum production.    Cardiovascular: Negative for chest pain and palpitations.   Gastrointestinal:  "Negative for nausea, vomiting, abdominal pain, diarrhea and constipation.   Genitourinary: Negative for dysuria, urgency and frequency.   Skin: Negative for rash or  itching.   Neurological: Negative for dizziness and tingling.    Psychiatric/Behavioral: Negative for depression.   Hematologic/lymphatic - denies bruising or excessive bleeding  All other systems reviewed and are negative.                   Objective:     /60 (BP Location: Left arm, Patient Position: Sitting, BP Cuff Size: Adult)   Pulse 73   Temp 37 °C (98.6 °F) (Temporal)   Resp 16   Ht 1.575 m (5' 2\")   Wt 56.7 kg (125 lb)   SpO2 97%       Physical Exam   Constitutional: patient is oriented to person, place, and time. Patient appears well-developed and well-nourished. No distress.   HENT:   Head: Normocephalic and atraumatic.   Right Ear: External ear normal.   Left Ear: External ear normal.   Nose: Mucosal edema  present. + bilat sinus TTP        Mouth/Throat: Mucous membranes are normal. No oral lesions.  No posterior pharyngeal erythema.  No oropharyngeal exudate or posterior oropharyngeal edema.   Eyes: Conjunctivae and EOM are normal. Pupils are equal, round, and reactive to light. Right eye exhibits no discharge. Left eye exhibits no discharge. No scleral icterus.   Neck: Normal range of motion. Neck supple. No tracheal deviation present.   Cardiovascular: Normal rate, regular rhythm and normal heart sounds.  Exam reveals no friction rub.    Pulmonary/Chest: Effort normal. No respiratory distress. Patient has no wheezes or rhonchi. Patient has no rales.    Musculoskeletal:  exhibits no edema.   Lymphadenopathy:     Patient has no cervical adenopathy.      Neurological: patient is alert and oriented to person, place, and time.   Skin: Skin is warm and dry. No rash noted. No erythema.   Psychiatric: patient  has a normal mood and affect.  behavior is normal.   Nursing note and vitals reviewed.       A/P:      1. Acute non-recurrent " maxillary sinusitis     - amoxicillin-clavulanate (AUGMENTIN) 875-125 MG Tab; Take 1 Tablet by mouth 2 times a day for 7 days.  Dispense: 14 Tablet; Refill: 0  - fluticasone (FLONASE) 50 MCG/ACT nasal spray; Administer 1 Spray into affected nostril(S) 2 times a day for 7 days.  Dispense: 16 g; Refill: 0        Differential diagnosis, natural history, supportive care, and indications for immediate follow-up discussed. All questions answered. Patient agrees with the plan of care.     Follow-up as needed if symptoms worsen or fail to improve to PCP, Urgent care or Emergency Room.     I have personally reviewed prior external notes and test results pertinent to today's visit.  I have independently reviewed and interpreted all diagnostics ordered during this urgent care acute visit.

## 2024-04-17 ENCOUNTER — OFFICE VISIT (OUTPATIENT)
Dept: URGENT CARE | Facility: PHYSICIAN GROUP | Age: 34
End: 2024-04-17
Payer: COMMERCIAL

## 2024-04-17 VITALS
DIASTOLIC BLOOD PRESSURE: 64 MMHG | BODY MASS INDEX: 22.08 KG/M2 | HEART RATE: 74 BPM | WEIGHT: 120 LBS | RESPIRATION RATE: 16 BRPM | SYSTOLIC BLOOD PRESSURE: 112 MMHG | OXYGEN SATURATION: 98 % | TEMPERATURE: 97.9 F | HEIGHT: 62 IN

## 2024-04-17 DIAGNOSIS — H53.8 BLURRED VISION, LEFT EYE: ICD-10-CM

## 2024-04-17 DIAGNOSIS — S05.02XA ABRASION OF LEFT CORNEA, INITIAL ENCOUNTER: ICD-10-CM

## 2024-04-17 PROCEDURE — 3078F DIAST BP <80 MM HG: CPT | Performed by: STUDENT IN AN ORGANIZED HEALTH CARE EDUCATION/TRAINING PROGRAM

## 2024-04-17 PROCEDURE — 3074F SYST BP LT 130 MM HG: CPT | Performed by: STUDENT IN AN ORGANIZED HEALTH CARE EDUCATION/TRAINING PROGRAM

## 2024-04-17 PROCEDURE — 99213 OFFICE O/P EST LOW 20 MIN: CPT | Performed by: STUDENT IN AN ORGANIZED HEALTH CARE EDUCATION/TRAINING PROGRAM

## 2024-04-17 RX ORDER — POLYMYXIN B SULFATE AND TRIMETHOPRIM 1; 10000 MG/ML; [USP'U]/ML
1 SOLUTION OPHTHALMIC EVERY 4 HOURS
Qty: 10 ML | Refills: 0 | Status: SHIPPED | OUTPATIENT
Start: 2024-04-17 | End: 2024-04-22

## 2024-04-17 ASSESSMENT — VISUAL ACUITY: OU: 1

## 2024-04-17 ASSESSMENT — FIBROSIS 4 INDEX: FIB4 SCORE: 0.47

## 2024-04-17 NOTE — LETTER
April 17, 2024    To Whom It May Concern:         This is confirmation that Jessica Curiel Hancock attended her scheduled appointment with Lux Collins P.A.-C. on 4/17/24.  Patient may return to work on 4/18/2024.         If you have any questions please do not hesitate to call me at the phone number listed below.    Sincerely,          Lux Collins P.A.-C.  [phone]
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
## 2024-08-20 ENCOUNTER — OFFICE VISIT (OUTPATIENT)
Dept: URGENT CARE | Facility: PHYSICIAN GROUP | Age: 34
End: 2024-08-20
Payer: COMMERCIAL

## 2024-08-20 VITALS
SYSTOLIC BLOOD PRESSURE: 100 MMHG | WEIGHT: 127 LBS | HEART RATE: 68 BPM | RESPIRATION RATE: 18 BRPM | HEIGHT: 62 IN | TEMPERATURE: 98.7 F | OXYGEN SATURATION: 99 % | DIASTOLIC BLOOD PRESSURE: 60 MMHG | BODY MASS INDEX: 23.37 KG/M2

## 2024-08-20 DIAGNOSIS — Z20.822 SUSPECTED COVID-19 VIRUS INFECTION: ICD-10-CM

## 2024-08-20 DIAGNOSIS — J02.9 PHARYNGITIS, UNSPECIFIED ETIOLOGY: Primary | ICD-10-CM

## 2024-08-20 LAB
FLUAV RNA SPEC QL NAA+PROBE: NEGATIVE
FLUBV RNA SPEC QL NAA+PROBE: NEGATIVE
RSV RNA SPEC QL NAA+PROBE: NEGATIVE
S PYO DNA SPEC NAA+PROBE: NOT DETECTED
SARS-COV-2 RNA RESP QL NAA+PROBE: POSITIVE

## 2024-08-20 PROCEDURE — 3074F SYST BP LT 130 MM HG: CPT | Performed by: NURSE PRACTITIONER

## 2024-08-20 PROCEDURE — 87651 STREP A DNA AMP PROBE: CPT | Performed by: NURSE PRACTITIONER

## 2024-08-20 PROCEDURE — 99213 OFFICE O/P EST LOW 20 MIN: CPT | Performed by: NURSE PRACTITIONER

## 2024-08-20 PROCEDURE — 3078F DIAST BP <80 MM HG: CPT | Performed by: NURSE PRACTITIONER

## 2024-08-20 PROCEDURE — 0241U POCT CEPHEID COV-2, FLU A/B, RSV - PCR: CPT | Performed by: NURSE PRACTITIONER

## 2024-08-20 ASSESSMENT — ENCOUNTER SYMPTOMS
RHINORRHEA: 1
COUGH: 1
SORE THROAT: 1

## 2024-08-20 ASSESSMENT — FIBROSIS 4 INDEX: FIB4 SCORE: 0.47

## 2024-08-20 NOTE — PROGRESS NOTES
"Subjective:   Jessica Hancock is a 33 y.o. female who presents for Sore Throat (Throat feels like burning, onset yesterday )     URI   This is a new problem. The current episode started yesterday. The problem has been gradually worsening. Associated symptoms include congestion, coughing, ear pain, rhinorrhea and a sore throat. She has tried acetaminophen for the symptoms. The treatment provided no relief.     Review of Systems   HENT:  Positive for congestion, ear pain, rhinorrhea and sore throat.    Respiratory:  Positive for cough.       Objective:   /60 (BP Location: Right arm, Patient Position: Sitting, BP Cuff Size: Adult)   Pulse 68   Temp 37.1 °C (98.7 °F) (Temporal)   Resp 18   Ht 1.575 m (5' 2\")   Wt 57.6 kg (127 lb)   SpO2 99%   BMI 23.23 kg/m²   Physical Exam  Vitals and nursing note reviewed.   Constitutional:       Appearance: Normal appearance. She is normal weight. She is ill-appearing.   HENT:      Head: Normocephalic and atraumatic.      Right Ear: External ear normal. A middle ear effusion is present. Tympanic membrane is not erythematous, retracted or bulging.      Left Ear: External ear normal. A middle ear effusion is present. Tympanic membrane is not erythematous, retracted or bulging.      Nose: Congestion and rhinorrhea present.      Mouth/Throat:      Mouth: Mucous membranes are moist.      Pharynx: Posterior oropharyngeal erythema and postnasal drip present. No pharyngeal swelling, oropharyngeal exudate or uvula swelling.   Eyes:      Extraocular Movements: Extraocular movements intact.      Conjunctiva/sclera: Conjunctivae normal.      Pupils: Pupils are equal, round, and reactive to light.   Cardiovascular:      Rate and Rhythm: Normal rate and regular rhythm.      Pulses: Normal pulses.      Heart sounds: Normal heart sounds.   Pulmonary:      Effort: Pulmonary effort is normal.      Breath sounds: Normal breath sounds. No wheezing.   Abdominal:      Palpations: " Abdomen is soft.      Tenderness: There is no abdominal tenderness.   Musculoskeletal:         General: Normal range of motion.      Cervical back: Normal range of motion and neck supple.   Skin:     General: Skin is warm and dry.      Findings: No rash.   Neurological:      General: No focal deficit present.      Mental Status: She is alert and oriented to person, place, and time.   Psychiatric:         Mood and Affect: Mood normal.         Behavior: Behavior normal.       Results for orders placed or performed in visit on 08/20/24   POCT Cepheid Group A Strep - PCR   Result Value Ref Range    POC Group A Strep, PCR Not Detected Not Detected, Invalid   POCT CEPHEID COV-2, FLU A/B, RSV - PCR   Result Value Ref Range    SARS-CoV-2 by PCR Positive (A) Negative, Invalid    Influenza virus A RNA Negative Negative, Invalid    Influenza virus B, PCR Negative Negative, Invalid    RSV, PCR Negative Negative, Invalid          Assessment/Plan:   1. Pharyngitis, unspecified etiology  - POCT Cepheid Group A Strep - PCR    2. Suspected COVID-19 virus infection  - POCT CEPHEID COV-2, FLU A/B, RSV - PCR    33-year-old female presents with concern for strep throat.  Vital signs are stable, afebrile.  She is in no acute distress.  She does appear mildly ill.  She has obvious sinus congestion, rhinorrhea and some erythema on the posterior oropharynx.  Exam is otherwise negative.  History and physical are consistent with viral bacterial pharyngitis.  Point-of-care strep ordered, point-of-care viral swab as well.  Patient has MyChart readily available we will communicate if further treatment is warranted. Recommend adequate hydration, rest, deep breathing and coughing, ambulation as tolerated, OTC medications. RTC PRN worsening condition or other concerns.   2:57 PM  Positive result as expected.  No change to treatment plan.     I personally reviewed prior external notes and prior test results pertinent to today's visit.   Discussed  management options, risks and benefits, and alternatives to treatment plan agreed upon.   Red flags discussed and indications to immediately call 911 or present to the Emergency Department.   Supportive care, differential diagnoses, and indications for immediate follow-up discussed with patient.    Patient expresses understanding and agrees to plan. Patient denies any other questions or concerns.

## 2024-08-20 NOTE — PATIENT INSTRUCTIONS
COVID-19  COVID-19, or coronavirus disease 2019, is an infection that is caused by a new (novel) coronavirus called SARS-CoV-2. COVID-19 can cause many symptoms. In some people, the virus may not cause any symptoms. In others, it may cause mild or severe symptoms. Some people with severe infection develop severe disease.  What are the causes?  This illness is caused by a virus. The virus may be in the air as tiny specks of fluid (aerosols) or droplets, or it may be on surfaces. You may catch the virus by:  Breathing in droplets from an infected person. Droplets can be spread by a person breathing, speaking, singing, coughing, or sneezing.  Touching something, like a table or a doorknob, that has virus on it (is contaminated) and then touching your mouth, nose, or eyes.  What increases the risk?  Risk for infection:  You are more likely to get infected with the COVID-19 virus if:  You are within 6 ft (1.8 m) of a person with COVID-19 for 15 minutes or longer.  You are providing care for a person who is infected with COVID-19.  You are in close personal contact with other people. Close personal contact includes hugging, kissing, or sharing eating or drinking utensils.  Risk for serious illness caused by COVID-19:  You are more likely to get seriously ill from the COVID-19 virus if:  You have cancer.  You have a long-term (chronic) disease, such as:  Chronic lung disease. This includes pulmonary embolism, chronic obstructive pulmonary disease, and cystic fibrosis.  Long-term disease that lowers your body's ability to fight infection (immunocompromise).  Serious cardiac conditions, such as heart failure, coronary artery disease, or cardiomyopathy.  Diabetes.  Chronic kidney disease.  Liver diseases. These include cirrhosis, nonalcoholic fatty liver disease, alcoholic liver disease, or autoimmune hepatitis.  You have obesity.  You are pregnant or were recently pregnant.  You have sickle cell disease.  What are the signs  or symptoms?  Symptoms of this condition can range from mild to severe. Symptoms may appear any time from 2 to 14 days after being exposed to the virus. They include:  Fever or chills.  Shortness of breath or trouble breathing.  Feeling tired or very tired.  Headaches, body aches, or muscle aches.  Runny or stuffy nose, sneezing, coughing, or sore throat.  New loss of taste or smell. This is rare.  Some people may also have stomach problems, such as nausea, vomiting, or diarrhea.  Other people may not have any symptoms of COVID-19.  How is this diagnosed?  This condition may be diagnosed by testing samples to check for the COVID-19 virus. The most common tests are the PCR test and the antigen test. Tests may be done in the lab or at home. They include:  Using a swab to take a sample of fluid from the back of your nose and throat (nasopharyngeal fluid), from your nose, or from your throat.  Testing a sample of saliva from your mouth.  Testing a sample of coughed-up mucus from your lungs (sputum).  How is this treated?  Treatment for COVID-19 infection depends on the severity of the condition.  Mild symptoms can be managed at home with rest, fluids, and over-the-counter medicines.  Serious symptoms may be treated in a hospital intensive care unit (ICU). Treatment in the ICU may include:  Supplemental oxygen. Extra oxygen is given through a tube in the nose, a face mask, or a castro.  Medicines. These may include:  Antivirals, such as monoclonal antibodies. These help your body fight off certain viruses that can cause disease.  Anti-inflammatories, such as corticosteroids. These reduce inflammation and suppress the immune system.  Antithrombotics. These prevent or treat blood clots, if they develop.  Convalescent plasma. This helps boost your immune system, if you have an underlying immunosuppressive condition or are getting immunosuppressive treatments.  Prone positioning. This means you will lie on your stomach. This  helps oxygen to get into your lungs.  Infection control measures.  If you are at risk for more serious illness caused by COVID-19, your health care provider may prescribe two long-acting monoclonal antibodies, given together every 6 months.  How is this prevented?  To protect yourself:  Use preventive medicine (pre-exposure prophylaxis). You may get pre-exposure prophylaxis if you have moderate or severe immunocompromise.  Get vaccinated. Anyone 6 months old or older who meets guidelines can get a COVID-19 vaccine or vaccine series. This includes people who are pregnant or making breast milk (lactating).  Get an added dose of COVID-19 vaccine after your first vaccine or vaccine series if you have moderate to severe immunocompromise. This applies if you have had a solid organ transplant or have been diagnosed with an immunocompromising condition.  You should get the added dose 4 weeks after you got the first COVID-19 vaccine or vaccine series.  If you get an mRNA vaccine, you will need a 3-dose primary series.  If you get the J&J/Kelby vaccine, you will need a 2-dose primary series, with the second dose being an mRNA vaccine.  Talk to your health care provider about getting experimental monoclonal antibodies. This treatment is approved under emergency use authorization to prevent severe illness before or after being exposed to the COVID-19 virus. You may be given monoclonal antibodies if:  You have moderate or severe immunocompromise. This includes treatments that lower your immune response. People with immunocompromise may not develop protection against COVID-19 when they are vaccinated.  You cannot be vaccinated. You may not get a vaccine if you have a severe allergic reaction to the vaccine or its components.  You are not fully vaccinated.  You are in a facility where COVID-19 is present and:  Are in close contact with a person who is infected with the COVID-19 virus.  Are at high risk of being exposed to the  COVID-19 virus.  You are at risk of illness from new variants of the COVID-19 virus.  To protect others:  If you have symptoms of COVID-19, take steps to prevent the virus from spreading to others.  Stay home. Leave your house only to get medical care. Do not use public transit, if possible.  Do not travel while you are sick.  Wash your hands often with soap and water for at least 20 seconds. If soap and water are not available, use alcohol-based hand .  Make sure that all people in your household wash their hands well and often.  Cough or sneeze into a tissue or your sleeve or elbow. Do not cough or sneeze into your hand or into the air.  Where to find more information  Centers for Disease Control and Prevention: www.cdc.gov/coronavirus  World Health Organization: www.who.int/health-topics/coronavirus  Get help right away if:  You have trouble breathing.  You have pain or pressure in your chest.  You are confused.  You have bluish lips and fingernails.  You have trouble waking from sleep.  You have symptoms that get worse.  These symptoms may be an emergency. Get help right away. Call 911.  Do not wait to see if the symptoms will go away.  Do not drive yourself to the hospital.  Summary  COVID-19 is an infection that is caused by a new coronavirus.  Sometimes, there are no symptoms. Other times, symptoms range from mild to severe. Some people with a severe COVID-19 infection develop severe disease.  The virus that causes COVID-19 can spread from person to person through droplets or aerosols from breathing, speaking, singing, coughing, or sneezing.  Mild symptoms of COVID-19 can be managed at home with rest, fluids, and over-the-counter medicines.  This information is not intended to replace advice given to you by your health care provider. Make sure you discuss any questions you have with your health care provider.  Document Revised: 12/08/2022 Document Reviewed: 12/08/2022  [employer] Patient Education ©  2021 [employer]